# Patient Record
Sex: MALE | Race: WHITE | NOT HISPANIC OR LATINO | Employment: OTHER | URBAN - METROPOLITAN AREA
[De-identification: names, ages, dates, MRNs, and addresses within clinical notes are randomized per-mention and may not be internally consistent; named-entity substitution may affect disease eponyms.]

---

## 2018-12-27 ENCOUNTER — OUTSIDE PLACE OF SERVICE (OUTPATIENT)
Dept: CARDIOLOGY | Facility: CLINIC | Age: 53
End: 2018-12-27
Payer: COMMERCIAL

## 2018-12-27 PROCEDURE — 93010 PR ELECTROCARDIOGRAM REPORT: ICD-10-PCS | Mod: S$GLB,,, | Performed by: STUDENT IN AN ORGANIZED HEALTH CARE EDUCATION/TRAINING PROGRAM

## 2018-12-27 PROCEDURE — 93010 ELECTROCARDIOGRAM REPORT: CPT | Mod: S$GLB,,, | Performed by: STUDENT IN AN ORGANIZED HEALTH CARE EDUCATION/TRAINING PROGRAM

## 2020-09-02 ENCOUNTER — OFFICE VISIT (OUTPATIENT)
Dept: UROLOGY | Facility: CLINIC | Age: 55
End: 2020-09-02
Payer: COMMERCIAL

## 2020-09-02 VITALS
HEIGHT: 67 IN | HEART RATE: 86 BPM | DIASTOLIC BLOOD PRESSURE: 81 MMHG | BODY MASS INDEX: 40.83 KG/M2 | SYSTOLIC BLOOD PRESSURE: 132 MMHG | WEIGHT: 260.13 LBS

## 2020-09-02 DIAGNOSIS — N48.6 PEYRONIE'S DISEASE: Primary | ICD-10-CM

## 2020-09-02 PROCEDURE — 3079F DIAST BP 80-89 MM HG: CPT | Mod: CPTII,S$GLB,, | Performed by: UROLOGY

## 2020-09-02 PROCEDURE — 99999 PR PBB SHADOW E&M-EST. PATIENT-LVL IV: CPT | Mod: PBBFAC,,, | Performed by: UROLOGY

## 2020-09-02 PROCEDURE — 3008F BODY MASS INDEX DOCD: CPT | Mod: CPTII,S$GLB,, | Performed by: UROLOGY

## 2020-09-02 PROCEDURE — 3008F PR BODY MASS INDEX (BMI) DOCUMENTED: ICD-10-PCS | Mod: CPTII,S$GLB,, | Performed by: UROLOGY

## 2020-09-02 PROCEDURE — 3075F PR MOST RECENT SYSTOLIC BLOOD PRESS GE 130-139MM HG: ICD-10-PCS | Mod: CPTII,S$GLB,, | Performed by: UROLOGY

## 2020-09-02 PROCEDURE — 99204 PR OFFICE/OUTPT VISIT, NEW, LEVL IV, 45-59 MIN: ICD-10-PCS | Mod: S$GLB,,, | Performed by: UROLOGY

## 2020-09-02 PROCEDURE — 99999 PR PBB SHADOW E&M-EST. PATIENT-LVL IV: ICD-10-PCS | Mod: PBBFAC,,, | Performed by: UROLOGY

## 2020-09-02 PROCEDURE — 99204 OFFICE O/P NEW MOD 45 MIN: CPT | Mod: S$GLB,,, | Performed by: UROLOGY

## 2020-09-02 PROCEDURE — 3075F SYST BP GE 130 - 139MM HG: CPT | Mod: CPTII,S$GLB,, | Performed by: UROLOGY

## 2020-09-02 PROCEDURE — 3079F PR MOST RECENT DIASTOLIC BLOOD PRESSURE 80-89 MM HG: ICD-10-PCS | Mod: CPTII,S$GLB,, | Performed by: UROLOGY

## 2020-09-02 RX ORDER — KETOCONAZOLE 20 MG/ML
SHAMPOO, SUSPENSION TOPICAL
COMMUNITY

## 2020-09-02 RX ORDER — PRAVASTATIN SODIUM 80 MG/1
80 TABLET ORAL DAILY
COMMUNITY
Start: 2020-08-07

## 2020-09-02 RX ORDER — TESTOSTERONE CYPIONATE 200 MG/ML
INJECTION, SOLUTION INTRAMUSCULAR
COMMUNITY

## 2020-09-02 RX ORDER — PENTOXIFYLLINE 400 MG/1
400 TABLET, EXTENDED RELEASE ORAL 2 TIMES DAILY
COMMUNITY
Start: 2020-08-03

## 2020-09-02 RX ORDER — TADALAFIL 20 MG/1
20 TABLET ORAL DAILY PRN
Qty: 10 TABLET | Refills: 11 | Status: SHIPPED | OUTPATIENT
Start: 2020-09-02

## 2020-09-02 RX ORDER — METHOCARBAMOL 750 MG/1
TABLET, FILM COATED ORAL
COMMUNITY

## 2020-09-02 RX ORDER — COLCHICINE 0.6 MG/1
0.6 TABLET ORAL 2 TIMES DAILY
COMMUNITY
Start: 2020-08-03

## 2020-09-02 RX ORDER — SILDENAFIL 100 MG/1
100 TABLET, FILM COATED ORAL DAILY PRN
COMMUNITY
Start: 2020-06-24

## 2020-09-02 NOTE — PROGRESS NOTES
CHIEF COMPLAINT:    Mr. Rodriguez is a 55 y.o. male presenting for a consultation at the request of Dr. Schwartz. Patient presents with peyronie's disease.    PRESENTING ILLNESS:    Navdeep Rodriguez is a 55 y.o. male who c/o peyronie's disease.  It's been present for 5 months.  He has ~ 80 degrees of curve dorsally.  He has pain with an erection. It is difficult to penetrate due to the curve.  Sex is painful for him.  He's on trental.    He has ED.  Has been present for > 5 months.  He's tried and failed Viagra.    He has hypogonadism.  Is on TRT managed by Dr. Schwartz.    He has LUTS.  Nocturia x 2.  Good FOS. No straining.  No hematuria.  He's pleased with how he voids.    Dr. Garland does his PCa screening.    REVIEW OF SYSTEMS:    Navdeep Rodriguez denies headache, blurred vision, fever, nausea, vomiting, chills, abdominal pain, chest pain, sore throat, bleeding per rectum, cough, SOB, recent loss of consciousness, recent mental status changes, seizures, dizziness, or upper or lower extremity weakness.    HELLEN  1. 1  2. 2  3. 1  4. 1  5. 1      PATIENT HISTORY:    Past Medical History:   Diagnosis Date    Arthritis     Asthma     Hypertension        Past Surgical History:   Procedure Laterality Date    APPENDECTOMY      BACK SURGERY      nerve block    EYE SURGERY      muscle lining the eyes    KNEE SURGERY      ROTATOR CUFF REPAIR         Family History   Problem Relation Age of Onset    Heart disease Father     Diabetes Brother        Social History     Socioeconomic History    Marital status:      Spouse name: Not on file    Number of children: Not on file    Years of education: Not on file    Highest education level: Not on file   Occupational History    Not on file   Social Needs    Financial resource strain: Not on file    Food insecurity     Worry: Not on file     Inability: Not on file    Transportation needs     Medical: Not on file     Non-medical: Not on file   Tobacco Use    Smoking status: Never  Smoker   Substance and Sexual Activity    Alcohol use: Yes     Alcohol/week: 0.8 standard drinks     Types: 1 drink(s) per week     Comment: occassional 1-2/wk    Drug use: No    Sexual activity: Not on file   Lifestyle    Physical activity     Days per week: Not on file     Minutes per session: Not on file    Stress: Not on file   Relationships    Social connections     Talks on phone: Not on file     Gets together: Not on file     Attends Mosque service: Not on file     Active member of club or organization: Not on file     Attends meetings of clubs or organizations: Not on file     Relationship status: Not on file   Other Topics Concern    Not on file   Social History Narrative    Not on file       Allergies:  Patient has no known allergies.    Medications:    Current Outpatient Medications:     aspirin (ECOTRIN) 81 MG EC tablet, Take 81 mg by mouth once daily., Disp: , Rfl:     cyanocobalamin 2000 MCG tablet, Take 2,000 mcg by mouth once daily., Disp: , Rfl:     diclofenac (VOLTAREN) 75 MG EC tablet, Take 75 mg by mouth 2 (two) times daily., Disp: , Rfl:     folic acid (FOLVITE) 1 MG tablet, Take 1 mg by mouth once daily., Disp: , Rfl:     gabapentin (NEURONTIN) 300 MG capsule, Take 900 mg by mouth every evening., Disp: , Rfl:     gabapentin (NEURONTIN) 300 mg tablet, Take 300 mg by mouth 3 (three) times daily., Disp: , Rfl:     glucosamine-chondroitin 500-400 mg tablet, Take 1 tablet by mouth 3 (three) times daily., Disp: , Rfl:     hydrocodone-acetaminophen 10-325mg (NORCO)  mg Tab, Take 1 tablet by mouth 5 (five) times daily. Prn pain, Disp: , Rfl:     losartan (COZAAR) 50 MG tablet, Take 50 mg by mouth once daily., Disp: , Rfl:     metoprolol succinate (TOPROL-XL) 25 MG 24 hr tablet, Take 1/2 a tab daily, Disp: 30 tablet, Rfl: 3    omega-3 acid ethyl esters (LOVAZA) 1 gram capsule, Take 2 g by mouth 2 (two) times daily., Disp: , Rfl:     omega-3 acid ethyl esters (LOVAZA) 1 gram  capsule, Take 1 g by mouth once., Disp: , Rfl:     pravastatin (PRAVACHOL) 20 MG tablet, Take 2 tablets (40 mg total) by mouth once daily., Disp: 30 tablet, Rfl: 3    tizanidine (ZANAFLEX) 4 MG tablet, Take 4 mg by mouth every 6 (six) hours as needed., Disp: , Rfl:     triamterene-hydrochlorothiazide 75-50 mg (MAXZIDE) 75-50 mg per tablet, Take 1 tablet by mouth once daily., Disp: , Rfl:     PHYSICAL EXAMINATION:    The patient generally appears in good health, is appropriately interactive, and is in no apparent distress.     Eyes: anicteric sclerae, moist conjunctivae; no lid-lag; PERRLA     HENT: Atraumatic; oropharynx clear with moist mucous membranes and no mucosal ulcerations;normal hard and soft palate.  No evidence of lymphadenopathy.    Neck: Trachea midline.  No thyromegaly.    Musculoskeletal: No abnormal gait.    Skin: No lesions.    Mental: Cooperative with normal affect.  Is oriented to time, place, and person.    Neuro: Grossly intact.    Chest: Normal inspiratory effort.   No accessory muscles.  No audible wheezes.  Respirations symmetric on inspiration and expiration.    Heart: Regular rhythm.      Abdomen:  Soft, non-tender. No masses or organomegaly. Bladder is not palpable. No evidence of flank discomfort. No evidence of inguinal hernia.    Genitourinary: The penis is circumcised with a plaque c/w peyronie's on the shaft The urethral meatus is normal. The testes, epididymides, and cord structures are normal in size and contour bilaterally. The scrotum is normal in size and contour.    LEONOR deferred as his PCP does his screening.    Extremities: No clubbing, cyanosis, or edema      LABS:      No results found for: PSA, PSADIAG, PSATOTAL, PSAFREE, PSAFREEPCT    IMPRESSION:    Encounter Diagnoses   Name Primary?    Peyronie's disease Yes         PLAN:    1. We discussed the pathophysiology of Peyronie's disease.  We discussed both the acute and chronic phase of the disease.  Because it has only  been present for 5 months, I discussed that I feel he is in the acute phase of the disease.  Continue Trental.  2. Will try Cialis for the ED. Side effects discussed.  A new Rx was given  3. Will plan for doppler u/s of his penis.  Discussed xiaflex vs IPP given he has ED and peyronie's.  4. Continue to see Dr. Schwartz for general urologic care.    Copy to: Efren

## 2020-09-02 NOTE — LETTER
September 2, 2020      Saud Schwartz MD  [address] [place] [address]           [facility] [address]  [address]  [facility] [address]  Phone: [phone]          Patient: Navdeep Rodriguez   MR Number: [id number]   YOB: 1965   Date of Visit: 9/2/2020       Dear Dr. Saud Schwatrz:    Thank you for referring Navdeep Rodriguez to me for evaluation. Attached you will find relevant portions of my assessment and plan of care.    If you have questions, please do not hesitate to call me. I look forward to following Navdeep Rodriguez along with you.    Sincerely,    Gregory Hazel MD    Enclosure  CC:  No Recipients    If you would like to receive this communication electronically, please contact [email or web address] or [phone] to request more information on [employer] Link access.    For providers and/or their staff who would like to refer a patient to [facility], please contact us through our one-stop-shop provider referral line, [facility], at [phone].    If you feel you have received this communication in error or would no longer like to receive these types of communications, please e-mail [email or web address]

## 2020-11-05 ENCOUNTER — TELEPHONE (OUTPATIENT)
Dept: UROLOGY | Facility: CLINIC | Age: 55
End: 2020-11-05

## 2020-11-05 ENCOUNTER — PROCEDURE VISIT (OUTPATIENT)
Dept: UROLOGY | Facility: CLINIC | Age: 55
End: 2020-11-05
Payer: COMMERCIAL

## 2020-11-05 VITALS
BODY MASS INDEX: 40.81 KG/M2 | DIASTOLIC BLOOD PRESSURE: 80 MMHG | RESPIRATION RATE: 16 BRPM | SYSTOLIC BLOOD PRESSURE: 131 MMHG | TEMPERATURE: 98 F | WEIGHT: 260 LBS | HEIGHT: 67 IN | HEART RATE: 70 BPM

## 2020-11-05 DIAGNOSIS — N48.6 PEYRONIE'S DISEASE: Primary | ICD-10-CM

## 2020-11-05 PROCEDURE — 93981 PR PENILE VASCULAR STUDY,LTD OR F/U: ICD-10-PCS | Mod: 26,59,S$GLB, | Performed by: UROLOGY

## 2020-11-05 PROCEDURE — 96372 PR INJECTION,THERAP/PROPH/DIAG2ST, IM OR SUBCUT: ICD-10-PCS | Mod: 59,S$GLB,, | Performed by: UROLOGY

## 2020-11-05 PROCEDURE — 96372 THER/PROPH/DIAG INJ SC/IM: CPT | Mod: 59,S$GLB,, | Performed by: UROLOGY

## 2020-11-05 PROCEDURE — 93981 PENILE VASCULAR STUDY: CPT | Mod: 26,59,S$GLB, | Performed by: UROLOGY

## 2020-11-05 PROCEDURE — 54235 NJX CORPORA CAVERNOSA RX AGT: CPT | Mod: S$GLB,,, | Performed by: UROLOGY

## 2020-11-05 PROCEDURE — 54235 PR INJECT CORPORA CAVERN,PHARM AGNT: ICD-10-PCS | Mod: S$GLB,,, | Performed by: UROLOGY

## 2020-11-05 PROCEDURE — 93980 PR PENILE VASCULAR STUDY,COMPLETE: ICD-10-PCS | Mod: 26,S$GLB,, | Performed by: UROLOGY

## 2020-11-05 PROCEDURE — 93980 PENILE VASCULAR STUDY: CPT | Mod: 26,S$GLB,, | Performed by: UROLOGY

## 2020-11-05 RX ORDER — PAPAVERINE HYDROCHLORIDE 30 MG/ML
90 INJECTION INTRAMUSCULAR; INTRAVENOUS
Status: COMPLETED | OUTPATIENT
Start: 2020-11-05 | End: 2020-11-05

## 2020-11-05 RX ADMIN — PAPAVERINE HYDROCHLORIDE 90 MG: 30 INJECTION INTRAMUSCULAR; INTRAVENOUS at 01:11

## 2020-11-05 NOTE — PROCEDURES
Procedures Date: 11/05/2020     Surgeon: Ilir     Assistant: None     Procedure performed: 1. Doppler ultrasound of the penis (complete and follow up)          2. Intracavernosal injection of pharmacologic agent   Blood loss: None     Specimen: None     Procedure in detail:   The risks, benefits, complications, treatment options, and expected outcomes were discussed with the patient. The patient concurred with the proposed plan, giving informed consent.     In the flaccid state the patient's cavernosal artery was found with the doppler ultrasound. This was first done on the R and then on the L. His peak systolic velocity was 8.29 cm/s on the R and 11.3 cm/s  on the L.  End diastolic velocity was 2.35 cm/s on the R and 0.99 cm on the L.   The resistive index was 0.72 on the R and 0.91 on the L.    Using standard sterile technique, the patient was then injected with 90 mg of papaverine. A full erection was achieved.  He had approximately 45 degree curvature dorsally.    The cavernosal arteries were then measured again with the doppler ultrasound. His peak systolic velocity was 11.4  cm/s on the R and 22.1 cm/s on the L. End diastolic velocity was 4.21 cm/s on the R and 5.44 cm/s on the L.   The resistive index was 0.63 on the R and 0.75 on the L.    He tolerated the procedure well without complication. He will be observed to ensure detumescence. He will be discharged home in stable condition and follow up to discuss his treatment options in the next 1-2 weeks.

## 2020-11-05 NOTE — TELEPHONE ENCOUNTER
Please set up for xiaflex.    After Michael's doppler, we discussed surgical options for his peyronie's.  We discussed IPP, Xiaflex, plication, and incision and grafting as well as the indications for each.  We discussed these in conjunction with his ultrasound findings.  He was given the chance to ask questions.  Alternative treatments were discussed as well.  He would like to proceed with IPP.    However, he has [employer].  Therefore, will try Xiaflex.  He understands that this will not improve his ED.  If he still has ED after the xiaflex, he may need ICI as he's failed oral meds.  Discussed that this can worsen peyronie's.    Risks and benefits of xiaflex discussed including pain, bleeding, failure to improve curve, increased risk of penile fracture, need for multiple treatment cycles.  Alternatives discussed.  He was given the chance to ask questions.

## 2020-11-05 NOTE — PATIENT INSTRUCTIONS
After your Doppler US Procedure/PEP injection:    You may resume your usual diet, medications and activities following the procedure. Some side effects may occur that usually do not need medical attention. These side effects may go away during treatment as your body adjusts to the medicine. Contact your [facility]rologist at [phone] if any of the following side effects continue or become extremely bothersome or if you have any questions.    Bruising or bleeding at place of injection   Burning (mild) along penis   Swelling at place of injection    You may also reach a urology resident on call after regular clinic hours and on weekends at [phone]. Papaverine injected into the penis may cause tingling at the tip of the penis. This is no cause for concern.

## 2020-12-21 NOTE — TELEPHONE ENCOUNTER
Pt has been scheduled for xiaflex. Insurance has not authorized. appt had to be rescheduled to approved.

## 2021-01-04 ENCOUNTER — OFFICE VISIT (OUTPATIENT)
Dept: SPORTS MEDICINE | Facility: CLINIC | Age: 56
End: 2021-01-04
Payer: COMMERCIAL

## 2021-01-04 ENCOUNTER — HOSPITAL ENCOUNTER (OUTPATIENT)
Dept: RADIOLOGY | Facility: HOSPITAL | Age: 56
Discharge: HOME | End: 2021-01-04
Attending: ORTHOPAEDIC SURGERY
Payer: COMMERCIAL

## 2021-01-04 VITALS
SYSTOLIC BLOOD PRESSURE: 138 MMHG | BODY MASS INDEX: 40.65 KG/M2 | WEIGHT: 259 LBS | DIASTOLIC BLOOD PRESSURE: 77 MMHG | HEART RATE: 70 BPM | HEIGHT: 67 IN

## 2021-01-04 DIAGNOSIS — M19.012 OSTEOARTHRITIS OF LEFT ACROMIOCLAVICULAR JOINT: Primary | ICD-10-CM

## 2021-01-04 DIAGNOSIS — M25.512 LEFT SHOULDER PAIN, UNSPECIFIED CHRONICITY: ICD-10-CM

## 2021-01-04 DIAGNOSIS — G25.89 SCAPULAR DYSKINESIS: ICD-10-CM

## 2021-01-04 DIAGNOSIS — M19.012 PRIMARY OSTEOARTHRITIS OF LEFT SHOULDER: ICD-10-CM

## 2021-01-04 PROCEDURE — 73030 X-RAY EXAM OF SHOULDER: CPT | Mod: 26,LT,, | Performed by: RADIOLOGY

## 2021-01-04 PROCEDURE — 99999 PR PBB SHADOW E&M-EST. PATIENT-LVL V: CPT | Mod: PBBFAC,,, | Performed by: ORTHOPAEDIC SURGERY

## 2021-01-04 PROCEDURE — 20605 INTERMEDIATE JOINT ASPIRATION/INJECTION: L ACROMIOCLAVICULAR: ICD-10-PCS | Mod: LT,S$GLB,, | Performed by: ORTHOPAEDIC SURGERY

## 2021-01-04 PROCEDURE — 3008F BODY MASS INDEX DOCD: CPT | Mod: CPTII,S$GLB,, | Performed by: ORTHOPAEDIC SURGERY

## 2021-01-04 PROCEDURE — 3078F DIAST BP <80 MM HG: CPT | Mod: CPTII,S$GLB,, | Performed by: ORTHOPAEDIC SURGERY

## 2021-01-04 PROCEDURE — 99999 PR PBB SHADOW E&M-EST. PATIENT-LVL V: ICD-10-PCS | Mod: PBBFAC,,, | Performed by: ORTHOPAEDIC SURGERY

## 2021-01-04 PROCEDURE — 3078F PR MOST RECENT DIASTOLIC BLOOD PRESSURE < 80 MM HG: ICD-10-PCS | Mod: CPTII,S$GLB,, | Performed by: ORTHOPAEDIC SURGERY

## 2021-01-04 PROCEDURE — 1126F PR PAIN SEVERITY QUANTIFIED, NO PAIN PRESENT: ICD-10-PCS | Mod: S$GLB,,, | Performed by: ORTHOPAEDIC SURGERY

## 2021-01-04 PROCEDURE — 73030 XR SHOULDER COMPLETE 2 OR MORE VIEWS LEFT: ICD-10-PCS | Mod: 26,LT,, | Performed by: RADIOLOGY

## 2021-01-04 PROCEDURE — 3008F PR BODY MASS INDEX (BMI) DOCUMENTED: ICD-10-PCS | Mod: CPTII,S$GLB,, | Performed by: ORTHOPAEDIC SURGERY

## 2021-01-04 PROCEDURE — 73030 X-RAY EXAM OF SHOULDER: CPT | Mod: TC,LT

## 2021-01-04 PROCEDURE — 20605 DRAIN/INJ JOINT/BURSA W/O US: CPT | Mod: LT,S$GLB,, | Performed by: ORTHOPAEDIC SURGERY

## 2021-01-04 PROCEDURE — 99203 PR OFFICE/OUTPT VISIT, NEW, LEVL III, 30-44 MIN: ICD-10-PCS | Mod: 25,S$GLB,, | Performed by: ORTHOPAEDIC SURGERY

## 2021-01-04 PROCEDURE — 3075F PR MOST RECENT SYSTOLIC BLOOD PRESS GE 130-139MM HG: ICD-10-PCS | Mod: CPTII,S$GLB,, | Performed by: ORTHOPAEDIC SURGERY

## 2021-01-04 PROCEDURE — 3075F SYST BP GE 130 - 139MM HG: CPT | Mod: CPTII,S$GLB,, | Performed by: ORTHOPAEDIC SURGERY

## 2021-01-04 PROCEDURE — 1126F AMNT PAIN NOTED NONE PRSNT: CPT | Mod: S$GLB,,, | Performed by: ORTHOPAEDIC SURGERY

## 2021-01-04 PROCEDURE — 99203 OFFICE O/P NEW LOW 30 MIN: CPT | Mod: 25,S$GLB,, | Performed by: ORTHOPAEDIC SURGERY

## 2021-01-04 RX ORDER — DEXAMETHASONE SODIUM PHOSPHATE 4 MG/ML
4 INJECTION, SOLUTION INTRA-ARTICULAR; INTRALESIONAL; INTRAMUSCULAR; INTRAVENOUS; SOFT TISSUE
Status: DISCONTINUED | OUTPATIENT
Start: 2021-01-04 | End: 2021-01-04 | Stop reason: HOSPADM

## 2021-01-04 RX ADMIN — DEXAMETHASONE SODIUM PHOSPHATE 4 MG: 4 INJECTION, SOLUTION INTRA-ARTICULAR; INTRALESIONAL; INTRAMUSCULAR; INTRAVENOUS; SOFT TISSUE at 03:01

## 2021-03-31 ENCOUNTER — OFFICE VISIT (OUTPATIENT)
Dept: SPORTS MEDICINE | Facility: CLINIC | Age: 56
End: 2021-03-31
Payer: COMMERCIAL

## 2021-03-31 VITALS
HEIGHT: 67 IN | WEIGHT: 255.19 LBS | BODY MASS INDEX: 40.05 KG/M2 | SYSTOLIC BLOOD PRESSURE: 129 MMHG | DIASTOLIC BLOOD PRESSURE: 79 MMHG

## 2021-03-31 DIAGNOSIS — M25.512 LEFT SHOULDER PAIN, UNSPECIFIED CHRONICITY: Primary | ICD-10-CM

## 2021-03-31 PROCEDURE — 1126F AMNT PAIN NOTED NONE PRSNT: CPT | Mod: S$GLB,,, | Performed by: ORTHOPAEDIC SURGERY

## 2021-03-31 PROCEDURE — 99213 OFFICE O/P EST LOW 20 MIN: CPT | Mod: S$GLB,,, | Performed by: ORTHOPAEDIC SURGERY

## 2021-03-31 PROCEDURE — 99999 PR PBB SHADOW E&M-EST. PATIENT-LVL IV: ICD-10-PCS | Mod: PBBFAC,,, | Performed by: ORTHOPAEDIC SURGERY

## 2021-03-31 PROCEDURE — 3074F SYST BP LT 130 MM HG: CPT | Mod: CPTII,S$GLB,, | Performed by: ORTHOPAEDIC SURGERY

## 2021-03-31 PROCEDURE — 3008F BODY MASS INDEX DOCD: CPT | Mod: CPTII,S$GLB,, | Performed by: ORTHOPAEDIC SURGERY

## 2021-03-31 PROCEDURE — 1126F PR PAIN SEVERITY QUANTIFIED, NO PAIN PRESENT: ICD-10-PCS | Mod: S$GLB,,, | Performed by: ORTHOPAEDIC SURGERY

## 2021-03-31 PROCEDURE — 3078F PR MOST RECENT DIASTOLIC BLOOD PRESSURE < 80 MM HG: ICD-10-PCS | Mod: CPTII,S$GLB,, | Performed by: ORTHOPAEDIC SURGERY

## 2021-03-31 PROCEDURE — 3078F DIAST BP <80 MM HG: CPT | Mod: CPTII,S$GLB,, | Performed by: ORTHOPAEDIC SURGERY

## 2021-03-31 PROCEDURE — 3008F PR BODY MASS INDEX (BMI) DOCUMENTED: ICD-10-PCS | Mod: CPTII,S$GLB,, | Performed by: ORTHOPAEDIC SURGERY

## 2021-03-31 PROCEDURE — 3074F PR MOST RECENT SYSTOLIC BLOOD PRESSURE < 130 MM HG: ICD-10-PCS | Mod: CPTII,S$GLB,, | Performed by: ORTHOPAEDIC SURGERY

## 2021-03-31 PROCEDURE — 99213 PR OFFICE/OUTPT VISIT, EST, LEVL III, 20-29 MIN: ICD-10-PCS | Mod: S$GLB,,, | Performed by: ORTHOPAEDIC SURGERY

## 2021-03-31 PROCEDURE — 99999 PR PBB SHADOW E&M-EST. PATIENT-LVL IV: CPT | Mod: PBBFAC,,, | Performed by: ORTHOPAEDIC SURGERY

## 2021-06-25 ENCOUNTER — OUTSIDE PLACE OF SERVICE (OUTPATIENT)
Dept: CARDIOLOGY | Facility: CLINIC | Age: 56
End: 2021-06-25
Payer: MEDICARE

## 2021-06-25 PROCEDURE — 93010 PR ELECTROCARDIOGRAM REPORT: ICD-10-PCS | Mod: ,,, | Performed by: INTERNAL MEDICINE

## 2021-06-25 PROCEDURE — 93010 ELECTROCARDIOGRAM REPORT: CPT | Mod: ,,, | Performed by: INTERNAL MEDICINE

## 2024-04-16 ENCOUNTER — HOSPITAL ENCOUNTER (EMERGENCY)
Facility: HOSPITAL | Age: 59
Discharge: HOME | End: 2024-04-16
Attending: EMERGENCY MEDICINE
Payer: COMMERCIAL

## 2024-04-16 VITALS
TEMPERATURE: 98 F | BODY MASS INDEX: 36.1 KG/M2 | SYSTOLIC BLOOD PRESSURE: 147 MMHG | HEART RATE: 89 BPM | DIASTOLIC BLOOD PRESSURE: 99 MMHG | RESPIRATION RATE: 19 BRPM | HEIGHT: 67 IN | WEIGHT: 230 LBS | OXYGEN SATURATION: 97 %

## 2024-04-16 DIAGNOSIS — R55 SYNCOPE: ICD-10-CM

## 2024-04-16 LAB
ALBUMIN SERPL BCP-MCNC: 4.2 G/DL (ref 3.5–5.2)
ALP SERPL-CCNC: 80 U/L (ref 38–126)
ALT SERPL W/O P-5'-P-CCNC: 67 U/L (ref 10–44)
ANION GAP SERPL CALC-SCNC: 10 MMOL/L (ref 8–16)
AST SERPL-CCNC: 49 U/L (ref 15–46)
BASOPHILS # BLD AUTO: 0.03 K/UL (ref 0–0.2)
BASOPHILS NFR BLD: 0.5 % (ref 0–1.9)
BILIRUB SERPL-MCNC: 1.1 MG/DL (ref 0.1–1)
BILIRUB UR QL STRIP: NEGATIVE
CALCIUM SERPL-MCNC: 9.7 MG/DL (ref 8.7–10.5)
CHLORIDE SERPL-SCNC: 100 MMOL/L (ref 95–110)
CK SERPL-CCNC: 213 U/L (ref 55–170)
CLARITY UR REFRACT.AUTO: CLEAR
CO2 SERPL-SCNC: 30 MMOL/L (ref 23–29)
COLOR UR AUTO: YELLOW
CREAT SERPL-MCNC: 1.34 MG/DL (ref 0.5–1.4)
D DIMER PPP IA.FEU-MCNC: 0.65 MG/L FEU
DIFFERENTIAL METHOD BLD: ABNORMAL
EOSINOPHIL # BLD AUTO: 0.4 K/UL (ref 0–0.5)
EOSINOPHIL NFR BLD: 6.8 % (ref 0–8)
ERYTHROCYTE [DISTWIDTH] IN BLOOD BY AUTOMATED COUNT: 12.7 % (ref 11.5–14.5)
EST. GFR  (NO RACE VARIABLE): >60 ML/MIN/1.73 M^2
GLUCOSE SERPL-MCNC: 101 MG/DL (ref 70–110)
GLUCOSE UR QL STRIP: NEGATIVE
HCT VFR BLD AUTO: 47.5 % (ref 40–54)
HGB BLD-MCNC: 16.2 G/DL (ref 14–18)
HGB UR QL STRIP: NEGATIVE
IMM GRANULOCYTES # BLD AUTO: 0.01 K/UL (ref 0–0.04)
IMM GRANULOCYTES NFR BLD AUTO: 0.2 % (ref 0–0.5)
KETONES UR QL STRIP: NEGATIVE
LEUKOCYTE ESTERASE UR QL STRIP: NEGATIVE
LYMPHOCYTES # BLD AUTO: 0.9 K/UL (ref 1–4.8)
LYMPHOCYTES NFR BLD: 15.7 % (ref 18–48)
MAGNESIUM SERPL-MCNC: 1.6 MG/DL (ref 1.6–2.6)
MCH RBC QN AUTO: 31.3 PG (ref 27–31)
MCHC RBC AUTO-ENTMCNC: 34.1 G/DL (ref 32–36)
MCV RBC AUTO: 92 FL (ref 82–98)
MONOCYTES # BLD AUTO: 0.6 K/UL (ref 0.3–1)
MONOCYTES NFR BLD: 10.8 % (ref 4–15)
NEUTROPHILS # BLD AUTO: 3.8 K/UL (ref 1.8–7.7)
NEUTROPHILS NFR BLD: 66 % (ref 38–73)
NITRITE UR QL STRIP: NEGATIVE
NRBC BLD-RTO: 0 /100 WBC
NT-PROBNP SERPL-MCNC: <20 PG/ML (ref 5–900)
OHS QRS DURATION: 96 MS
OHS QTC CALCULATION: 428 MS
PH UR STRIP: 7 [PH] (ref 5–8)
PLATELET # BLD AUTO: 143 K/UL (ref 150–450)
PMV BLD AUTO: 9.4 FL (ref 9.2–12.9)
POCT GLUCOSE: 97 MG/DL (ref 70–110)
POTASSIUM SERPL-SCNC: 4.1 MMOL/L (ref 3.5–5.1)
PROT SERPL-MCNC: 7.5 G/DL (ref 6–8.4)
PROT UR QL STRIP: ABNORMAL
RBC # BLD AUTO: 5.18 M/UL (ref 4.6–6.2)
SODIUM SERPL-SCNC: 140 MMOL/L (ref 136–145)
SP GR UR STRIP: 1.02 (ref 1–1.03)
TROPONIN I SERPL-MCNC: <0.012 NG/ML (ref 0.01–0.03)
URN SPEC COLLECT METH UR: ABNORMAL
UROBILINOGEN UR STRIP-ACNC: 1 EU/DL
UUN UR-MCNC: 28 MG/DL (ref 2–20)
WBC # BLD AUTO: 5.75 K/UL (ref 3.9–12.7)

## 2024-04-16 PROCEDURE — 99285 EMERGENCY DEPT VISIT HI MDM: CPT | Mod: 25,ER

## 2024-04-16 PROCEDURE — 93010 ELECTROCARDIOGRAM REPORT: CPT | Mod: ,,, | Performed by: INTERNAL MEDICINE

## 2024-04-16 PROCEDURE — 63600175 PHARM REV CODE 636 W HCPCS: Mod: ER | Performed by: PHYSICIAN ASSISTANT

## 2024-04-16 PROCEDURE — 83735 ASSAY OF MAGNESIUM: CPT | Mod: ER | Performed by: PHYSICIAN ASSISTANT

## 2024-04-16 PROCEDURE — 83880 ASSAY OF NATRIURETIC PEPTIDE: CPT | Mod: ER | Performed by: PHYSICIAN ASSISTANT

## 2024-04-16 PROCEDURE — 85025 COMPLETE CBC W/AUTO DIFF WBC: CPT | Mod: ER | Performed by: PHYSICIAN ASSISTANT

## 2024-04-16 PROCEDURE — 25000003 PHARM REV CODE 250: Mod: ER | Performed by: PHYSICIAN ASSISTANT

## 2024-04-16 PROCEDURE — 94761 N-INVAS EAR/PLS OXIMETRY MLT: CPT | Mod: ER

## 2024-04-16 PROCEDURE — 85379 FIBRIN DEGRADATION QUANT: CPT | Mod: ER | Performed by: PHYSICIAN ASSISTANT

## 2024-04-16 PROCEDURE — 96361 HYDRATE IV INFUSION ADD-ON: CPT | Mod: ER

## 2024-04-16 PROCEDURE — 84484 ASSAY OF TROPONIN QUANT: CPT | Mod: ER | Performed by: PHYSICIAN ASSISTANT

## 2024-04-16 PROCEDURE — 81003 URINALYSIS AUTO W/O SCOPE: CPT | Mod: ER | Performed by: PHYSICIAN ASSISTANT

## 2024-04-16 PROCEDURE — 80053 COMPREHEN METABOLIC PANEL: CPT | Mod: ER | Performed by: PHYSICIAN ASSISTANT

## 2024-04-16 PROCEDURE — 82962 GLUCOSE BLOOD TEST: CPT | Mod: ER

## 2024-04-16 PROCEDURE — 96374 THER/PROPH/DIAG INJ IV PUSH: CPT | Mod: 59,ER

## 2024-04-16 PROCEDURE — 93005 ELECTROCARDIOGRAM TRACING: CPT | Mod: ER

## 2024-04-16 PROCEDURE — 82550 ASSAY OF CK (CPK): CPT | Mod: ER | Performed by: PHYSICIAN ASSISTANT

## 2024-04-16 PROCEDURE — 25500020 PHARM REV CODE 255: Mod: ER | Performed by: PHYSICIAN ASSISTANT

## 2024-04-16 PROCEDURE — 99900035 HC TECH TIME PER 15 MIN (STAT): Mod: ER

## 2024-04-16 RX ORDER — ONDANSETRON HYDROCHLORIDE 2 MG/ML
4 INJECTION, SOLUTION INTRAVENOUS
Status: COMPLETED | OUTPATIENT
Start: 2024-04-16 | End: 2024-04-16

## 2024-04-16 RX ORDER — PREDNISOLONE ACETATE 10 MG/ML
SUSPENSION/ DROPS OPHTHALMIC
COMMUNITY
Start: 2024-04-15

## 2024-04-16 RX ORDER — NAPROXEN 500 MG/1
500 TABLET ORAL 2 TIMES DAILY
COMMUNITY
Start: 2024-04-01

## 2024-04-16 RX ORDER — CYCLOBENZAPRINE HCL 10 MG
10 TABLET ORAL NIGHTLY
COMMUNITY

## 2024-04-16 RX ORDER — TADALAFIL 5 MG/1
5 TABLET ORAL DAILY
COMMUNITY

## 2024-04-16 RX ADMIN — ONDANSETRON 4 MG: 2 INJECTION INTRAMUSCULAR; INTRAVENOUS at 01:04

## 2024-04-16 RX ADMIN — IOHEXOL 100 ML: 350 INJECTION, SOLUTION INTRAVENOUS at 02:04

## 2024-04-16 RX ADMIN — SODIUM CHLORIDE 1000 ML: 9 INJECTION, SOLUTION INTRAVENOUS at 01:04

## 2024-04-16 NOTE — ED NOTES
LOC: The patient is awake, alert and aware of environment with an appropriate affect, the patient is oriented x 3 and speaking appropriate.  APPEARANCE: Patient resting comfortably and in no acute distress, patient is clean and well groomed, patient's clothing is properly fastened.  SKIN: The skin is warm and dry, patient has normal skin turgor and moist mucus membranes.  RESPIRATORY: Airway is open and patent, respirations are spontaneous, patient has a normal effort and rate.  Pain in left lateral ribs with inspiration.  Pain 4/10 with normal breathing and 8/10 with deep inspiration.    CARDIAC: Patient has a normal rate and rhythm, no periphreal edema noted, capillary refill < 3 seconds.  ABDOMEN: Soft and non tender to palpation, no distention noted.  NEUROLOGIC: PERRL, facial expression is symmetrical, patient moving all extremities, normal sensation in all extremities when touched with a finger. Patient is able to grasp both hands and has equal strength and the patient was able to stick his tongue out and smile.  Patient also able to push against hands with his feet-equal strength bilaterally.

## 2024-04-16 NOTE — PHARMACY MED REC
"[facility]           Pharmacy  Admission Medication History     The home medication history was taken by Josefina Keller.      Medication history obtained from Medications listed below were obtained from: Patient/family    Based on information gathered for medication list, you may go to "Admission" then "Reconcile Home Medications" tabs to review and/or act upon those items.     The home medication list has been updated by the Pharmacy department.   Please read ALL comments highlighted in yellow.   Please address this information as you see fit.    Feel free to contact us if you have any questions or require assistance.    The medications listed below were removed from the home medication list.  Please reorder if appropriate:    Patient reports NOT TAKING the following medication(s):  Voltaren 75mg  Norco 10-325mg  Toprol xl 25mg      Current Facility-Administered Medications on File Prior to Encounter   Medication Dose Route Frequency Provider Last Rate Last Admin    collagenase injection 0.58 mg  0.58 mg Intra-Lesional 1 time in Clinic/HOD Gregory Hazel MD         Current Outpatient Medications on File Prior to Encounter   Medication Sig Dispense Refill    aspirin (ECOTRIN) 81 MG EC tablet Take 81 mg by mouth once daily.      colchicine (COLCRYS) 0.6 mg tablet Take 0.6 mg by mouth 2 (two) times daily.      cyanocobalamin 2000 MCG tablet Take 2,000 mcg by mouth once daily.      cyclobenzaprine (FLEXERIL) 10 MG tablet Take 10 mg by mouth every evening.      folic acid (FOLVITE) 1 MG tablet Take 1 mg by mouth once daily.      gabapentin (NEURONTIN) 300 MG capsule Take 1,200 mg by mouth every evening. 4 capsules      losartan (COZAAR) 50 MG tablet Take 50 mg by mouth once daily.      methocarbamoL (ROBAXIN) 750 MG Tab Take 750 mg by mouth 3 (three) times daily as needed (muscle spasms).      naproxen (NAPROSYN) 500 MG tablet Take 500 mg by mouth 2 (two) times daily.      omega-3 acid ethyl " esters (LOVAZA) 1 gram capsule Take 1 g by mouth once daily.      pentoxifylline (TRENTAL) 400 mg TbSR Take 400 mg by mouth 2 (two) times daily.      pravastatin (PRAVACHOL) 80 MG tablet Take 80 mg by mouth once daily.      prednisoLONE acetate (PRED FORTE) 1 % DrpS Place into both eyes.      tadalafiL (CIALIS) 5 MG tablet Take 5 mg by mouth once daily.      testosterone cypionate (DEPO-TESTOSTERONE) 200 mg/mL injection Inject 2 mLs into the muscle every 28 days.      triamterene-hydrochlorothiazide 75-50 mg (MAXZIDE) 75-50 mg per tablet Take 1 tablet by mouth once daily.      ketoconazole (NIZORAL) 2 % shampoo ketoconazole 2 % shampoo   WASH SCALP EVERY OTHER DAY -- WAIT 10 MINUTES BEFORE RINSING         Please address this information as you see fit.  Feel free to contact us if you have any questions or require assistance.    Josefina Keller  [phone]                  .

## 2024-04-16 NOTE — ED PROVIDER NOTES
Encounter Date: 4/16/2024       History     Chief Complaint   Patient presents with    Loss of Consciousness     PT states yesterday I was cutting grass and I got over heated and blacked out. Pt states he did not hit his head, but he hit his ribs and feels they may be fractured. PT states he still feels a little loopy today.      58-year-old male PMH HTN, HLD, chronic back pain, presents to ED after syncopal event that occurred this morning.  Patient reports he was outside of his home move around boxes with minimal exertion when he states he suddenly began to feel lightheaded followed by brief syncopal event.  During syncopal event he does report contusing left side of rib cage against edge of table and then falling onto left-sided buttock.  Denying any head injury with no associated headache.  Since event he has continued to have generalized nausea and mild fatigue.  He denies any associated dizziness or sensation of room spinning, acute visual changes, acute numbness or focal weakness, chest pain, cough, shortness breath, palpitations, abdominal issues, urinary or bowel complaints.  No recent travel or surgery, history of DVT/PE, leg swelling.  No other acute complaints at this time    The history is provided by the patient.     Review of patient's allergies indicates:  No Known Allergies  Past Medical History:   Diagnosis Date    Arthritis     Asthma     Hypertension      Past Surgical History:   Procedure Laterality Date    APPENDECTOMY      BACK SURGERY      nerve block    EYE SURGERY      muscle lining the eyes    KNEE SURGERY      ROTATOR CUFF REPAIR       Family History   Problem Relation Name Age of Onset    Heart disease Father      Diabetes Brother       Social History     Tobacco Use    Smoking status: Never   Substance Use Topics    Alcohol use: Yes     Alcohol/week: 0.8 standard drinks of alcohol     Types: 1 drink(s) per week     Comment: occassional 1-2/wk    Drug use: No     Review of Systems    Constitutional:  Negative for chills and fever.   Eyes:  Negative for visual disturbance.   Respiratory:  Negative for cough and shortness of breath.    Cardiovascular:  Negative for chest pain, palpitations and leg swelling.        Left rib tenderness   Gastrointestinal:  Positive for nausea. Negative for abdominal pain, diarrhea and vomiting.   Genitourinary:  Negative for dysuria and frequency.   Musculoskeletal:  Positive for back pain (Chronic). Negative for neck pain and neck stiffness.   Neurological:  Positive for syncope and light-headedness. Negative for dizziness, weakness and headaches.       Physical Exam     Initial Vitals [04/16/24 1141]   BP Pulse Resp Temp SpO2   137/86 91 18 97.9 °F (36.6 °C) (!) 94 %      MAP       --         Physical Exam    Vitals reviewed.  Constitutional: He appears well-developed and well-nourished. He is cooperative. He does not have a sickly appearance. He does not appear ill. No distress.   HENT:   Head: Normocephalic and atraumatic.   No signs of head trauma   Eyes: EOM are normal.   Neck:   Normal range of motion.  Cardiovascular:  Normal rate and regular rhythm.           Pulmonary/Chest: Effort normal and breath sounds normal.   Abdominal: There is no abdominal tenderness.   Musculoskeletal:      Cervical back: Normal range of motion. No spinous process tenderness or muscular tenderness.     Neurological: He is alert and oriented to person, place, and time. GCS eye subscore is 4. GCS verbal subscore is 5. GCS motor subscore is 6.   Psychiatric: He has a normal mood and affect. His speech is normal and behavior is normal.         ED Course   Procedures  Labs Reviewed   CBC W/ AUTO DIFFERENTIAL - Abnormal; Notable for the following components:       Result Value    MCH 31.3 (*)     Platelets 143 (*)     Lymph # 0.9 (*)     Lymph % 15.7 (*)     All other components within normal limits   COMPREHENSIVE METABOLIC PANEL - Abnormal; Notable for the following components:     CO2 30 (*)     BUN 28 (*)     Total Bilirubin 1.1 (*)     AST 49 (*)     ALT 67 (*)     All other components within normal limits   URINALYSIS, REFLEX TO URINE CULTURE - Abnormal; Notable for the following components:    Protein, UA Trace (*)     All other components within normal limits    Narrative:     Preferred Collection Type->Urine, Clean Catch  Specimen Source->Urine   CK - Abnormal; Notable for the following components:     (*)     All other components within normal limits   D DIMER, QUANTITATIVE - Abnormal; Notable for the following components:    D-Dimer 0.65 (*)     All other components within normal limits   NT-PRO NATRIURETIC PEPTIDE   TROPONIN I   MAGNESIUM   POCT GLUCOSE   POCT GLUCOSE MONITORING CONTINUOUS        ECG Results              EKG 12-lead (In process)        Collection Time Result Time QRS Duration OHS QTC Calculation    04/16/24 12:35:36 04/16/24 14:09:00 96 428                     In process by Interface, Lab In [facility] (04/16/24 14:09:07)                   Narrative:    Test Reason : R55,    Vent. Rate : 088 BPM     Atrial Rate : 088 BPM     P-R Int : 190 ms          QRS Dur : 096 ms      QT Int : 354 ms       P-R-T Axes : 048 038 028 degrees     QTc Int : 428 ms    Normal sinus rhythm  Cannot rule out Anterior infarct ,age undetermined  Abnormal ECG  When compared with ECG of 25-JUN-2021 10:08,  No significant change was found    Referred By: [email or web address]   SELF           Confirmed By:                                   Imaging Results              CTA Chest Non-Coronary (PE Studies) (Final result)  Result time 04/16/24 15:05:24      Final result by Genaro Bhakta MD (Timothy) (04/16/24 15:05:24)                   Impression:      Lungs are clear.  No evidence of pulmonary embolism.    Suspected discitis at T12-L1.  Correlation with MRI of the lumbar spine is recommended    Gallstones.  Splenomegaly.    All CT scans at this facility use dose modulation, iterative reconstruction  and/or weight based dosing when appropriate to reduce radiation dose to as low as reasonably achievable.      Electronically signed by: Genaro Bhakta MD  Date:    04/16/2024  Time:    15:05               Narrative:    EXAMINATION:  CTA CHEST NON CORONARY (PE STUDIES)    CLINICAL HISTORY:  Pulmonary embolism (PE) suspected, positive D-dimer;    TECHNIQUE:  Standard CTA of the chest performed with 100 cc Omnipaque 350 utilizing 3-D MIP reformations.    COMPARISON:  None    FINDINGS:  Cardiac size is normal.  No coronary artery calcifications.  Major pulmonary arteries are normal.    Lung fields are clear.    The spleen is enlarged.  Gallstones are present    There is endplate irregularity at T12-L1 which could be related to discitis.                                       X-Ray Chest 1 View (Final result)  Result time 04/16/24 12:57:40      Final result by Genaro Bhakta (Rosendo), MD (04/16/24 12:57:40)                   Impression:      Lungs appear clear.      Electronically signed by: Genaro Bhakta MD  Date:    04/16/2024  Time:    12:57               Narrative:    EXAMINATION:  XR CHEST 1 VIEW    CLINICAL HISTORY:  Syncope,    COMPARISON:  Chest, 06/23/2013.    FINDINGS:  One view.  Heart size is normal. The lung fields are clear. No acute cardiopulmonary infiltrate.                                       Medications   sodium chloride 0.9% bolus 1,000 mL 1,000 mL (0 mLs Intravenous Stopped 4/16/24 1458)   ondansetron injection 4 mg (4 mg Intravenous Given 4/16/24 1304)   iohexoL (OMNIPAQUE 350) injection 100 mL (100 mLs Intravenous Given 4/16/24 1407)     Medical Decision Making  Patient presents to ED after syncopal event that occurred this morning. Vitals grossly unremarkable. Patient in no distress on exam    DDx:  Including but not limited to dehydration, PRIYA, rhabdomyolysis, electrolyte abnormality, ACS, STEMI, NSTEMI, PE, CAD, pneumothorax, hemothorax, rib fracture, hypoglycemia, vasovagal,  syncope    Amount and/or Complexity of Data Reviewed  Labs: ordered. Decision-making details documented in ED Course.  Radiology: ordered. Decision-making details documented in ED Course.  ECG/medicine tests: ordered.     Details: EKG NSR, rate 88, no acute ST elevation.  No T-wave inversion.  No STEMI.  EKG reviewed by attending, Dr. Ahumada    Risk  Prescription drug management.              Attending Attestation:     Physician Attestation Statement for NP/PA:   I personally made/approved the management plan and take responsibility for the patient management.    Other NP/PA Attestation Additions:      Medical Decision Making: Agree with assessment and plan.             ED Course as of 04/16/24 1623   Tue Apr 16, 2024   1323 POCT glucose  WNL [KS]   1323 CBC auto differential(!)  No elevation in WBC.  Stable H/H.  [KS]   1336 CK(!)  Mildly elevated 213 [KS]   1336 NT-Pro Natriuretic Peptide  Unremarkable [KS]   1336 Magnesium  WNL [KS]   1337 Comprehensive metabolic panel(!)  Slight elevation LFTs.  No significant electrolyte abnormality.  Creatinine 1.3, just slightly higher than baseline 1.1 [KS]   1338 X-Ray Chest 1 View  No acute cardiopulmonary findings per Radiology review [KS]   1338 Orthostatics unremarkable [KS]   1351 D dimer, quantitative(!)  Elevated 0.65.  Will proceed with PE study [KS]   1351 Troponin I  WNL [KS]   1416 Urinalysis, Reflex to Urine Culture Urine, Clean Catch(!)  Unremarkable [KS]   1558 CTA Chest Non-Coronary (PE Studies)  PE study per Radiology review showing no evidence of pulmonary embolism with no acute cardiopulmonary findings.  Radiology does mention concern for possible T12-L1 diskitis. [KS]   1559 Concern for possible T12-L1 diskitis were further discussed and reviewed by neurosurgeon, Dr. Sami Sheridan, who has reviewed images and agrees to low likelihood of diskitis and likely postoperative findings.  He did recommend outpatient follow-up with his neurosurgeon. [KS]   1621  Patient was reassessed and continues to rest comfortably with normal vitals.  He states his symptoms have resolved and he has remained comfortable appearing.  Stable for discharge with very close outpatient follow-up.  ED return precautions were discussed at length.  Patient states his understanding and agrees with plan [KS]   1621 Patient discussed with attending, Dr. Ahumada, who agrees with ED course and dispo. [KS]      ED Course User Index  [KS] Dalton Leger PA-C                           Clinical Impression:  Final diagnoses:  [R55] Syncope          ED Disposition Condition    Discharge Stable          ED Prescriptions    None       Follow-up Information       Follow up With Specialties Details Why Contact Info    Elpidio Garland MD Family Medicine   [address]   [facility] [id number]  [phone]               Dalton Leger PA-C  04/16/24 1627       Dalton Leger PA-C  04/16/24 [id number]       Fareed Ahumada MD  04/16/24 3346

## 2024-04-16 NOTE — DISCHARGE INSTRUCTIONS

## 2024-10-02 ENCOUNTER — TELEPHONE (OUTPATIENT)
Dept: ORTHOPEDICS | Facility: CLINIC | Age: 59
End: 2024-10-02
Payer: COMMERCIAL

## 2024-10-02 DIAGNOSIS — M25.531 RIGHT WRIST PAIN: Primary | ICD-10-CM

## 2024-10-03 ENCOUNTER — HOSPITAL ENCOUNTER (OUTPATIENT)
Dept: RADIOLOGY | Facility: HOSPITAL | Age: 59
Discharge: HOME | End: 2024-10-03
Attending: ORTHOPAEDIC SURGERY
Payer: COMMERCIAL

## 2024-10-03 ENCOUNTER — OFFICE VISIT (OUTPATIENT)
Dept: ORTHOPEDICS | Facility: CLINIC | Age: 59
End: 2024-10-03
Payer: COMMERCIAL

## 2024-10-03 DIAGNOSIS — M25.531 RIGHT WRIST PAIN: ICD-10-CM

## 2024-10-03 DIAGNOSIS — M19.039 WRIST ARTHRITIS: Primary | ICD-10-CM

## 2024-10-03 PROCEDURE — 99999 PR PBB SHADOW E&M-EST. PATIENT-LVL III: CPT | Mod: PBBFAC,,, | Performed by: ORTHOPAEDIC SURGERY

## 2024-10-03 PROCEDURE — 73110 X-RAY EXAM OF WRIST: CPT | Mod: TC,PN,RT

## 2024-10-03 PROCEDURE — 73110 X-RAY EXAM OF WRIST: CPT | Mod: 26,RT,, | Performed by: RADIOLOGY

## 2024-10-03 RX ORDER — TRIAMCINOLONE ACETONIDE 40 MG/ML
20 INJECTION, SUSPENSION INTRA-ARTICULAR; INTRAMUSCULAR
Status: COMPLETED | OUTPATIENT
Start: 2024-10-03 | End: 2024-10-03

## 2024-10-03 RX ADMIN — TRIAMCINOLONE ACETONIDE 20 MG: 40 INJECTION, SUSPENSION INTRA-ARTICULAR; INTRAMUSCULAR at 11:10

## 2024-10-03 NOTE — PROGRESS NOTES
Subjective:      Patient ID: Navdeep Rodriguez is a 59 y.o. male.    Chief Complaint: Pain of the Right Wrist and Consult      HPI  Navdeep Rodriguez is a  59 y.o. male presenting today for right wrist pain.  There was not a history of trauma.  Onset of symptoms began several months ago although he has wrist problems in the past   No numbness or tingling reported   No history of trauma   .      Review of patient's allergies indicates:  No Known Allergies      Current Outpatient Medications   Medication Sig Dispense Refill    aspirin (ECOTRIN) 81 MG EC tablet Take 81 mg by mouth once daily.      colchicine (COLCRYS) 0.6 mg tablet Take 0.6 mg by mouth 2 (two) times daily.      cyanocobalamin 2000 MCG tablet Take 2,000 mcg by mouth once daily.      folic acid (FOLVITE) 1 MG tablet Take 1 mg by mouth once daily.      gabapentin (NEURONTIN) 300 MG capsule Take 1,200 mg by mouth every evening. 4 capsules      ketoconazole (NIZORAL) 2 % shampoo ketoconazole 2 % shampoo   WASH SCALP EVERY OTHER DAY -- WAIT 10 MINUTES BEFORE RINSING      losartan (COZAAR) 50 MG tablet Take 50 mg by mouth once daily.      methocarbamoL (ROBAXIN) 750 MG Tab Take 750 mg by mouth 3 (three) times daily as needed (muscle spasms).      omega-3 acid ethyl esters (LOVAZA) 1 gram capsule Take 1 g by mouth once daily.      pentoxifylline (TRENTAL) 400 mg TbSR Take 400 mg by mouth 2 (two) times daily.      pravastatin (PRAVACHOL) 80 MG tablet Take 80 mg by mouth once daily.      prednisoLONE acetate (PRED FORTE) 1 % DrpS Place into both eyes.      tadalafiL (CIALIS) 5 MG tablet Take 5 mg by mouth once daily.      testosterone cypionate (DEPO-TESTOSTERONE) 200 mg/mL injection Inject 2 mLs into the muscle every 28 days.      triamterene-hydrochlorothiazide 75-50 mg (MAXZIDE) 75-50 mg per tablet Take 1 tablet by mouth once daily.      cyclobenzaprine (FLEXERIL) 10 MG tablet Take 10 mg by mouth every evening. (Patient not taking: Reported on 10/3/2024)      naproxen  (NAPROSYN) 500 MG tablet Take 500 mg by mouth 2 (two) times daily.       Current Facility-Administered Medications   Medication Dose Route Frequency Provider Last Rate Last Admin    collagenase injection 0.58 mg  0.58 mg Intra-Lesional 1 time in Clinic/HOD Gregory Hazel MD           Past Medical History:   Diagnosis Date    Arthritis     Asthma     Hypertension        Past Surgical History:   Procedure Laterality Date    APPENDECTOMY      BACK SURGERY      nerve block    EYE SURGERY      muscle lining the eyes    KNEE SURGERY      ROTATOR CUFF REPAIR         Review of Systems:  ROS    OBJECTIVE:     PHYSICAL EXAM:       Vitals:    10/03/24 1117   PainSc:   6   PainLoc: Wrist     Well developed, well nourished male in no acute distress  Alert and oriented x 3  HEENT- Normal exam  Lungs- Clear to auscultation  Heart- Regular rate and rhythm  Abdomen- Soft nontender  Extremity exam- examination right wrist there is some swelling mild diffuse tenderness   Range of motion slightly decreased due to pain   Range of motion fingers full Tinel sign negative sensation intact    strength slightly decreased    RADIOG x-ray right wrist shows arthritic changes throughout the carpus and cystic formation which would be characteristic of gouty arthritis RAPHS:  AP lateral  Comments: I have personally reviewed the imaging and I agree with the above radiologist's report.    ASSESSMENT/PLAN:     IMPRESSION:  Probable gouty arthritis severe right wrist    PLAN:  I explained the nature of the problem to the patient   Recommended injection today   After pause for time-out identified the right wrist injected dorsally with combination Kenalog 20 mg 0.5 cc xylocaine sterile technique   Tolerated the procedure well without complication   I have also given him a wrist brace for part-time use   In the future he may benefit from surgery for proximal row carpectomy   Follow-up 6-8 weeks       - We talked at length about the anatomy and  pathophysiology of   Encounter Diagnosis   Name Primary?    Wrist arthritis Yes           Disclaimer: This note has been generated using voice-recognition software. There may be typographical errors that have been missed during proof-reading.

## 2024-10-28 ENCOUNTER — OFFICE VISIT (OUTPATIENT)
Dept: ORTHOPEDICS | Facility: CLINIC | Age: 59
End: 2024-10-28
Payer: COMMERCIAL

## 2024-10-28 VITALS — HEIGHT: 67 IN | BODY MASS INDEX: 36.02 KG/M2

## 2024-10-28 DIAGNOSIS — M19.039 WRIST ARTHRITIS: Primary | ICD-10-CM

## 2024-10-28 PROCEDURE — 3008F BODY MASS INDEX DOCD: CPT | Mod: CPTII,S$GLB,, | Performed by: ORTHOPAEDIC SURGERY

## 2024-10-28 PROCEDURE — 1159F MED LIST DOCD IN RCRD: CPT | Mod: CPTII,S$GLB,, | Performed by: ORTHOPAEDIC SURGERY

## 2024-10-28 PROCEDURE — 4010F ACE/ARB THERAPY RXD/TAKEN: CPT | Mod: CPTII,S$GLB,, | Performed by: ORTHOPAEDIC SURGERY

## 2024-10-28 PROCEDURE — 99999 PR PBB SHADOW E&M-EST. PATIENT-LVL III: CPT | Mod: PBBFAC,,, | Performed by: ORTHOPAEDIC SURGERY

## 2024-10-28 PROCEDURE — 99213 OFFICE O/P EST LOW 20 MIN: CPT | Mod: S$GLB,,, | Performed by: ORTHOPAEDIC SURGERY

## 2024-10-28 RX ORDER — DICLOFENAC SODIUM 50 MG/1
50 TABLET, DELAYED RELEASE ORAL 2 TIMES DAILY WITH MEALS
Qty: 60 TABLET | Refills: 3 | Status: SHIPPED | OUTPATIENT
Start: 2024-10-28

## 2024-12-16 ENCOUNTER — OFFICE VISIT (OUTPATIENT)
Dept: ORTHOPEDICS | Facility: CLINIC | Age: 59
End: 2024-12-16
Payer: COMMERCIAL

## 2024-12-16 VITALS — HEIGHT: 67 IN | BODY MASS INDEX: 36.02 KG/M2

## 2024-12-16 DIAGNOSIS — M19.039 WRIST ARTHRITIS: Primary | ICD-10-CM

## 2024-12-16 PROCEDURE — 3008F BODY MASS INDEX DOCD: CPT | Mod: CPTII,S$GLB,, | Performed by: ORTHOPAEDIC SURGERY

## 2024-12-16 PROCEDURE — 99213 OFFICE O/P EST LOW 20 MIN: CPT | Mod: S$GLB,,, | Performed by: ORTHOPAEDIC SURGERY

## 2024-12-16 PROCEDURE — 99999 PR PBB SHADOW E&M-EST. PATIENT-LVL III: CPT | Mod: PBBFAC,,, | Performed by: ORTHOPAEDIC SURGERY

## 2024-12-16 PROCEDURE — 1159F MED LIST DOCD IN RCRD: CPT | Mod: CPTII,S$GLB,, | Performed by: ORTHOPAEDIC SURGERY

## 2024-12-16 PROCEDURE — 4010F ACE/ARB THERAPY RXD/TAKEN: CPT | Mod: CPTII,S$GLB,, | Performed by: ORTHOPAEDIC SURGERY

## 2024-12-16 NOTE — PROGRESS NOTES
Subjective:      Patient ID: Navdeep Rodriguez is a 59 y.o. male.  Chief Complaint: Follow-up (Right wrist )      HPI  Navdeep Rodriguez is a  59 y.o. male presenting today for follow up of arthritis and gout.  He reports that he is much improved after the injection last visit   Pain is minimal   Does take colchicine but I want him to check with his urologist about that maybe primary care as well.    Review of patient's allergies indicates:  No Known Allergies      Current Outpatient Medications   Medication Sig Dispense Refill    aspirin (ECOTRIN) 81 MG EC tablet Take 81 mg by mouth once daily.      colchicine (COLCRYS) 0.6 mg tablet Take 0.6 mg by mouth 2 (two) times daily.      cyanocobalamin 2000 MCG tablet Take 2,000 mcg by mouth once daily.      cyclobenzaprine (FLEXERIL) 10 MG tablet Take 10 mg by mouth every evening.      diclofenac (VOLTAREN) 50 MG EC tablet Take 1 tablet (50 mg total) by mouth 2 (two) times daily with meals. 60 tablet 3    folic acid (FOLVITE) 1 MG tablet Take 1 mg by mouth once daily.      gabapentin (NEURONTIN) 300 MG capsule Take 1,200 mg by mouth every evening. 4 capsules      ketoconazole (NIZORAL) 2 % shampoo ketoconazole 2 % shampoo   WASH SCALP EVERY OTHER DAY -- WAIT 10 MINUTES BEFORE RINSING      losartan (COZAAR) 50 MG tablet Take 50 mg by mouth once daily.      methocarbamoL (ROBAXIN) 750 MG Tab Take 750 mg by mouth 3 (three) times daily as needed (muscle spasms).      omega-3 acid ethyl esters (LOVAZA) 1 gram capsule Take 1 g by mouth once daily.      pentoxifylline (TRENTAL) 400 mg TbSR Take 400 mg by mouth 2 (two) times daily.      pravastatin (PRAVACHOL) 80 MG tablet Take 80 mg by mouth once daily.      tadalafiL (CIALIS) 5 MG tablet Take 5 mg by mouth once daily.      testosterone cypionate (DEPO-TESTOSTERONE) 200 mg/mL injection Inject 2 mLs into the muscle every 28 days.      triamterene-hydrochlorothiazide 75-50 mg (MAXZIDE) 75-50 mg per tablet Take 1 tablet by mouth once daily.    "   naproxen (NAPROSYN) 500 MG tablet Take 500 mg by mouth 2 (two) times daily.      prednisoLONE acetate (PRED FORTE) 1 % DrpS Place into both eyes. (Patient not taking: Reported on 12/16/2024)       Current Facility-Administered Medications   Medication Dose Route Frequency Provider Last Rate Last Admin    collagenase injection 0.58 mg  0.58 mg Intra-Lesional 1 time in Clinic/HOD Gregory Hazel MD           Past Medical History:   Diagnosis Date    Arthritis     Asthma     Hypertension        Past Surgical History:   Procedure Laterality Date    APPENDECTOMY      BACK SURGERY      nerve block    EYE SURGERY      muscle lining the eyes    KNEE SURGERY      ROTATOR CUFF REPAIR         OBJECTIVE:   PHYSICAL EXAM:  Height: 5' 7" (170.2 cm)    Vitals:    12/16/24 0854   Height: 5' 7" (1.702 m)   PainSc:   4   PainLoc: Wrist     Ortho/SPM Exam  Examination right wrist no swelling no tenderness range of motion almost full  strength is good sensation intact    RADIOGRAPHS:  None  Comments: I have personally reviewed the imaging and I agree with the above radiologist's report.    ASSESSMENT/PLAN:     IMPRESSION:  Right wrist arthritis and gout    PLAN:  Continue wrist brace as needed continue current medications       FOLLOW UP:  2-3 months or as needed    Disclaimer: This note has been generated using voice-recognition software. There may be typographical errors that have been missed during proof-reading.     "

## 2025-03-06 ENCOUNTER — OFFICE VISIT (OUTPATIENT)
Dept: ORTHOPEDICS | Facility: CLINIC | Age: 60
End: 2025-03-06
Payer: COMMERCIAL

## 2025-03-06 VITALS — BODY MASS INDEX: 36.02 KG/M2 | HEIGHT: 67 IN

## 2025-03-06 DIAGNOSIS — M19.039 WRIST ARTHRITIS: Primary | ICD-10-CM

## 2025-03-06 PROCEDURE — 99999 PR PBB SHADOW E&M-EST. PATIENT-LVL III: CPT | Mod: PBBFAC,,, | Performed by: ORTHOPAEDIC SURGERY

## 2025-03-06 RX ORDER — TRIAMCINOLONE ACETONIDE 40 MG/ML
20 INJECTION, SUSPENSION INTRA-ARTICULAR; INTRAMUSCULAR
Status: COMPLETED | OUTPATIENT
Start: 2025-03-06 | End: 2025-03-06

## 2025-03-06 RX ADMIN — TRIAMCINOLONE ACETONIDE 20 MG: 40 INJECTION, SUSPENSION INTRA-ARTICULAR; INTRAMUSCULAR at 09:03

## 2025-03-06 NOTE — PROGRESS NOTES
"Subjective:      Patient ID: Navdeep Rodriguez is a 59 y.o. male.  Chief Complaint: Follow-up and Pain of the Right Wrist      HPI  Navdeep Rodriguez is a  59 y.o. male presenting today for follow up of gouty arthritis of the right wrist.  He reports that he is having a flare-up in the wrist with pain mainly localized to the ulnar side of the wrist   No numbness or tingling reported.    Review of patient's allergies indicates:  No Known Allergies      Current Medications[1]    Past Medical History:   Diagnosis Date    Arthritis     Asthma     Hypertension        Past Surgical History:   Procedure Laterality Date    APPENDECTOMY      BACK SURGERY      nerve block    EYE SURGERY      muscle lining the eyes    KNEE SURGERY      ROTATOR CUFF REPAIR         OBJECTIVE:   PHYSICAL EXAM:  Height: 5' 7" (170.2 cm)    Vitals:    03/06/25 0850   Height: 5' 7" (1.702 m)   PainSc:   4   PainLoc: Wrist     Ortho/SPM Exam  Examination right wrist mild swelling is noted tenderness dorsally and over the TFC   Range of motion slightly decreased range of motion fingers full   No triggering   Tinel sign negative    RADIOGRAPHS:  Previous x-rays show moderate to severe degenerative changes right  Comments: I have personally reviewed the imaging and I agree with the above radiologist's report.    ASSESSMENT/PLAN:     IMPRESSION:  Gouty arthritis right wrist    PLAN:  Patient would like injection today   After pause for time-out identified the right wrist injected ulnar carpal joint combination Kenalog 20 mg 0.5 cc xylocaine sterile technique  Tolerated the procedure well without complication   Continue gout medications and wrist brace part-time use    FOLLOW UP:  2-3 months    Disclaimer: This note has been generated using voice-recognition software. There may be typographical errors that have been missed during proof-reading.          [1]   Current Outpatient Medications   Medication Sig Dispense Refill    aspirin (ECOTRIN) 81 MG EC tablet Take 81 " mg by mouth once daily.      colchicine (COLCRYS) 0.6 mg tablet Take 0.6 mg by mouth 2 (two) times daily.      cyanocobalamin 2000 MCG tablet Take 2,000 mcg by mouth once daily.      cyclobenzaprine (FLEXERIL) 10 MG tablet Take 10 mg by mouth every evening.      diclofenac (VOLTAREN) 50 MG EC tablet Take 1 tablet (50 mg total) by mouth 2 (two) times daily with meals. 60 tablet 3    folic acid (FOLVITE) 1 MG tablet Take 1 mg by mouth once daily.      gabapentin (NEURONTIN) 300 MG capsule Take 1,200 mg by mouth every evening. 4 capsules      ketoconazole (NIZORAL) 2 % shampoo ketoconazole 2 % shampoo   WASH SCALP EVERY OTHER DAY -- WAIT 10 MINUTES BEFORE RINSING      losartan (COZAAR) 50 MG tablet Take 50 mg by mouth once daily.      methocarbamoL (ROBAXIN) 750 MG Tab Take 750 mg by mouth 3 (three) times daily as needed (muscle spasms).      omega-3 acid ethyl esters (LOVAZA) 1 gram capsule Take 1 g by mouth once daily.      pentoxifylline (TRENTAL) 400 mg TbSR Take 400 mg by mouth 2 (two) times daily.      pravastatin (PRAVACHOL) 80 MG tablet Take 80 mg by mouth once daily.      prednisoLONE acetate (PRED FORTE) 1 % DrpS Place into both eyes.      tadalafiL (CIALIS) 5 MG tablet Take 5 mg by mouth once daily.      testosterone cypionate (DEPO-TESTOSTERONE) 200 mg/mL injection Inject 2 mLs into the muscle every 28 days.      triamterene-hydrochlorothiazide 75-50 mg (MAXZIDE) 75-50 mg per tablet Take 1 tablet by mouth once daily.      naproxen (NAPROSYN) 500 MG tablet Take 500 mg by mouth 2 (two) times daily.       Current Facility-Administered Medications   Medication Dose Route Frequency Provider Last Rate Last Admin    collagenase injection 0.58 mg  0.58 mg Intra-Lesional 1 time in Clinic/HOD Gregory Hazel MD

## 2025-06-02 ENCOUNTER — TELEPHONE (OUTPATIENT)
Dept: ORTHOPEDICS | Facility: CLINIC | Age: 60
End: 2025-06-02
Payer: MEDICARE

## 2025-06-05 ENCOUNTER — TELEPHONE (OUTPATIENT)
Dept: ORTHOPEDICS | Facility: CLINIC | Age: 60
End: 2025-06-05
Payer: MEDICARE

## 2025-06-10 ENCOUNTER — TELEPHONE (OUTPATIENT)
Dept: ORTHOPEDICS | Facility: CLINIC | Age: 60
End: 2025-06-10
Payer: MEDICARE

## 2025-06-10 NOTE — TELEPHONE ENCOUNTER
----- Message from Jerzy sent at 6/10/2025 10:12 AM CDT -----  .Type:  Sooner Apoointment RequestCaller is requesting a sooner appointment.  Caller declined first available appointment listed below.  Caller will not accept being placed on the waitlist and is requesting a message be sent to doctor.Name of Caller:ptWhen is the first available appointment?7/14Symptoms:severe painWould the patient rather a call back or a response via [employer]? Call [facility] Call Back Number:[phone] Information:

## 2025-06-23 ENCOUNTER — OFFICE VISIT (OUTPATIENT)
Dept: ORTHOPEDICS | Facility: CLINIC | Age: 60
End: 2025-06-23
Payer: COMMERCIAL

## 2025-06-23 DIAGNOSIS — M25.531 RIGHT WRIST PAIN: ICD-10-CM

## 2025-06-23 DIAGNOSIS — M19.031 ARTHRITIS OF RIGHT WRIST: Primary | ICD-10-CM

## 2025-06-23 PROCEDURE — 99999 PR PBB SHADOW E&M-EST. PATIENT-LVL III: CPT | Mod: PBBFAC,,,

## 2025-06-23 PROCEDURE — 1159F MED LIST DOCD IN RCRD: CPT | Mod: CPTII,S$GLB,,

## 2025-06-23 PROCEDURE — 4010F ACE/ARB THERAPY RXD/TAKEN: CPT | Mod: CPTII,S$GLB,,

## 2025-06-23 PROCEDURE — 1160F RVW MEDS BY RX/DR IN RCRD: CPT | Mod: CPTII,S$GLB,,

## 2025-06-23 PROCEDURE — 20605 DRAIN/INJ JOINT/BURSA W/O US: CPT | Mod: RT,S$GLB,,

## 2025-06-23 PROCEDURE — 3066F NEPHROPATHY DOC TX: CPT | Mod: CPTII,S$GLB,,

## 2025-06-23 PROCEDURE — 99214 OFFICE O/P EST MOD 30 MIN: CPT | Mod: 25,S$GLB,,

## 2025-06-23 RX ORDER — OXYCODONE AND ACETAMINOPHEN 5; 325 MG/1; MG/1
1 TABLET ORAL EVERY 4 HOURS PRN
COMMUNITY

## 2025-06-23 RX ADMIN — TRIAMCINOLONE ACETONIDE 20 MG: 40 INJECTION, SUSPENSION INTRA-ARTICULAR; INTRAMUSCULAR at 08:06

## 2025-06-23 NOTE — PROGRESS NOTES
Subjective:      Patient ID: Navdeep Rodriguez is a 59 y.o. male.    Chief Complaint: Pain of the Right Wrist      HPI: Navdeep Rodriguez is a 59 y.o. right hand dominant male who presents to clinic for follow up of gouty arthritis of right wrist. Patient was last seen by Dr. Berman on 3/6/2025 for this and corticosteroid injection was performed. Patient reports symptoms returned around 1 month ago. He denies a history of trauma, but symptoms have been present for years. He reports that the pain is a 8/10 pain today. The pain is aggravated by cold temperatures and use of the hand/wrist. Denies numbness, tingling, radiation. Previous treatments include: CSI, NSAIDs, Tylenol, activity modification with some temporary relief.  The pain is affecting ADLs and limiting desired level of activity.     He has received good relief with right wrist injection in the past (last injection on 3/6/2025) and is requesting repeat cortisone injection today in the right wrist.    PAST MEDICAL HISTORY:    Past Medical History:   Diagnosis Date    Arthritis     Asthma     Hypertension      MEDICATIONS:   Current Medications[1]    ALLERGIES:   Review of patient's allergies indicates:  No Known Allergies    Review of Systems:  Constitution: Negative for chills, fever and night sweats.   HENT: Negative for congestion and headaches.    Eyes: Negative for blurred vision or vision loss.  Cardiovascular: Negative for chest pain and syncope.   Respiratory: Negative for cough and shortness of breath.    Endocrine: Negative for polydipsia, polyphagia and polyuria.   Hematologic/Lymphatic: Negative for bleeding problem. Does not bruise/bleed easily.   Skin: Negative for dry skin, itching and rash.   Musculoskeletal: See HPI.   Gastrointestinal: Negative for abdominal pain and bowel incontinence.   Genitourinary: Negative for bladder incontinence and nocturia.   Neurological: Negative for disturbances in coordination, loss of balance and seizures.    Psychiatric/Behavioral: Negative for depression. The patient does not have insomnia.    Allergic/Immunologic: Negative for hives and persistent infections.          Objective:      There were no vitals filed for this visit.    PHYSICAL EXAM:  General: Alert & oriented x3, well-developed and well-nourished, in no acute distress, sitting comfortably in the exam room.  Skin: Warm and dry. Capillary refill less than 2 seconds.   Head: Normocephalic and atraumatic.   Eyes: Sclera appear normal.   Nose: No deformities seen.   Ears: No deformities seen.   Neck: No tracheal deviation present.   Pulmonary/Chest: Breathing unlabored.   Neurological: Alert and oriented to person, place, and time.   Psychiatric: Mood is pleasant and affect appropriate.     RIGHT HAND/WRIST:  Observation/Inspection:   No evidence of visible deformity, swelling, discoloration, scars, or atrophy.  Palpation:    Tenderness to palpation dorsally and over the TFC.   Range of Motion:   Wrist: Limited to wrist flexion, extension, radial, and ulnar deviation.  Digits: Full to digit MCP, PIP, and DIP flexion and extension without pain or difficulty.  No triggering.  Special Tests:   Tinel's Test - Carpal Tunnel Negative    Neurovascular Exam:   Digits warm and well perfused, brisk capillary refill <3 seconds throughout.  NVI motor/LTS to median, radial, and ulnar nerves, radial pulse 2+.    Imaging:   X-Rays: 3 views of right wrist dated 10/03/2024, and independently reviewed, show: Severe radiolunate osteoarthritis with resulting bone-on-bone articulation and prominent associated subchondral sclerosis and cystic change. Component of avascular necrosis of the lunate cannot be excluded. Distal radioulnar osteoarthritis. No acute displaced fractures. No subluxation or dislocation. Well corticated osseous body adjacent to the dorsal/lateral aspect of the triquetrum, possibly sequela of remote trauma.       Assessment:       1. Arthritis of right wrist     2. Right wrist pain        Plan:       Orders Placed This Encounter    Intermediate Joint Aspiration/Injection     I explained the nature of the problem to the patient.     I discussed at length with the patient all the different treatment options available for his right wrist including anti-inflammatories, acetaminophen, rest, ice, heat, physical/occupational therapy, and corticosteroid injections. I explained the potential role of surgery in the treatment of this condition. The patient understands that if non-surgical measures do not adequately control symptoms, surgery will be considered in the future.     Patient states he understands he will likely need surgery in the future. Patient states he would like to continue to treat with occasional corticosteroid injections until they no longer provide relief. I am in agreement with this plan.     Medications:  Continue OTC tylenol and/or NSAIDs as needed for pain management.   Procedures:  Corticosteroid injection requested and performed today to the right wrist joint. See procedure note. Standard precautions provided.    Pain Management:  Encouraged patient to apply ice and/or heat compress to the affected area 2-3x a day for 15-20 minutes as needed for pain management.      Follow-Up: 3 months for follow up.     All of the patient's questions were answered and the patient will contact us if they have any questions or concerns in the interim.    Kathy Calderon PA-C  [facility]  Orthopedic Surgery    Medical Dictation software was used during the dictation of portions or the entirety of this medical record.  Phonetic or grammatic errors may exist due to the use of this software. For clarification, refer to the author of the document.             [1]   Current Outpatient Medications:     aspirin (ECOTRIN) 81 MG EC tablet, Take 81 mg by mouth once daily., Disp: , Rfl:     colchicine (COLCRYS) 0.6 mg tablet, Take 0.6 mg by mouth 2 (two) times daily., Disp: ,  Rfl:     cyanocobalamin 2000 MCG tablet, Take 2,000 mcg by mouth once daily., Disp: , Rfl:     cyclobenzaprine (FLEXERIL) 10 MG tablet, Take 10 mg by mouth every evening., Disp: , Rfl:     folic acid (FOLVITE) 1 MG tablet, Take 1 mg by mouth once daily., Disp: , Rfl:     glucosamine/chondr baumann A sod (GLUCOSAMINE-CHONDROITIN) 1,500-1,200 mg/30 mL Liqd, Take by mouth., Disp: , Rfl:     ketoconazole (NIZORAL) 2 % shampoo, ketoconazole 2 % shampoo  WASH SCALP EVERY OTHER DAY -- WAIT 10 MINUTES BEFORE RINSING, Disp: , Rfl:     losartan (COZAAR) 50 MG tablet, Take 50 mg by mouth once daily., Disp: , Rfl:     methocarbamoL (ROBAXIN) 750 MG Tab, Take 750 mg by mouth 3 (three) times daily as needed (muscle spasms)., Disp: , Rfl:     methotrexate 2.5 MG Tab, Take 2.5 mg by mouth every 7 days. Take 7 tablet pot q 7, Disp: , Rfl:     omega-3 acid ethyl esters (LOVAZA) 1 gram capsule, Take 1 g by mouth once daily., Disp: , Rfl:     oxyCODONE-acetaminophen (PERCOCET) 5-325 mg per tablet, Take 1 tablet by mouth every 4 (four) hours as needed for Pain., Disp: , Rfl:     pentoxifylline (TRENTAL) 400 mg TbSR, Take 400 mg by mouth 2 (two) times daily., Disp: , Rfl:     pravastatin (PRAVACHOL) 80 MG tablet, Take 80 mg by mouth once daily., Disp: , Rfl:     prednisoLONE acetate (PRED FORTE) 1 % DrpS, Place into both eyes., Disp: , Rfl:     tadalafiL (CIALIS) 5 MG tablet, Take 5 mg by mouth once daily., Disp: , Rfl:     testosterone cypionate (DEPO-TESTOSTERONE) 200 mg/mL injection, Inject 2 mLs into the muscle every 28 days., Disp: , Rfl:     triamcinolone acetonide 0.025% (KENALOG) 0.025 % Oint, Apply topically 2 (two) times daily., Disp: , Rfl:     triamterene-hydrochlorothiazide 75-50 mg (MAXZIDE) 75-50 mg per tablet, Take 1 tablet by mouth once daily., Disp: , Rfl:     Current Facility-Administered Medications:     collagenase injection 0.58 mg, 0.58 mg, Intra-Lesional, 1 time in Clinic/HOD, Gregory Hazel MD

## 2025-06-24 ENCOUNTER — HOSPITAL ENCOUNTER (OUTPATIENT)
Dept: RADIOLOGY | Facility: HOSPITAL | Age: 60
Discharge: HOME | End: 2025-06-24
Attending: NURSE PRACTITIONER
Payer: COMMERCIAL

## 2025-06-24 DIAGNOSIS — N18.32 STAGE 3B CHRONIC KIDNEY DISEASE: ICD-10-CM

## 2025-06-24 PROCEDURE — 76770 US EXAM ABDO BACK WALL COMP: CPT | Mod: 26,,, | Performed by: STUDENT IN AN ORGANIZED HEALTH CARE EDUCATION/TRAINING PROGRAM

## 2025-06-24 PROCEDURE — 76770 US EXAM ABDO BACK WALL COMP: CPT | Mod: TC,PN

## 2025-06-24 RX ORDER — TRIAMCINOLONE ACETONIDE 40 MG/ML
20 INJECTION, SUSPENSION INTRA-ARTICULAR; INTRAMUSCULAR
Status: DISCONTINUED | OUTPATIENT
Start: 2025-06-23 | End: 2025-06-24 | Stop reason: HOSPADM

## 2025-06-24 NOTE — PROCEDURES
Intermediate Joint Aspiration/Injection    Date/Time: 6/23/2025 8:30 AM    Performed by: Kathy Calderon PA-C  Authorized by: Kathy Calderon PA-C    Indications:  Arthritis and pain  Site marked: The procedure site was marked    Timeout: Prior to procedure the correct patient, procedure, and site was verified      Location:  Wrist    Local anesthesia used?: Yes    Local anesthetic:  Topical anesthetic  Wrist joint: R ulnar-carpal.  Ultrasonic Guidance for needle placement: No  Needle size:  25 G  Approach: ulnar.  Medications:  20 mg triamcinolone acetonide 40 mg/mL  Patient tolerance:  Patient tolerated the procedure well with no immediate complications      Injection Procedure Note   Prior to procedure the correct patient, procedure, and site was verified. Allergies were reviewed and verbal consent was obtained. The procedure site was marked.    After time out was performed, the patient was prepped aseptically with betadine, the area was sprayed with local topical anesthetic, and then cleaned again with betadine. A therapeutic injection of combination of 0.5cc 1% Xylocaine and 20mg Triamcinolone was given under sterile technique using a 25-gauge x 5/8 needle in the right wrist, ulnar carpal approach. Sterile dressing applied.     Patient tolerated the procedure well. Pain decreased. He had no adverse reactions to the medication.     The patient is cautioned that immediate relief of pain is secondary to the local anesthetic and will be temporary. After the anesthetic wears off there may be a increase in pain that may last for a few hours or a few days. Advised patient to avoid strenuous activities for the next 24-48 hours and to apply ice for 20 minutes to help alleviate this this pain. He was warned of possible blood sugar and/or blood pressure changes following injection. He was reminded to call the clinic immediately for any adverse side effects as explained in clinic today.

## 2025-07-09 ENCOUNTER — LAB VISIT (OUTPATIENT)
Dept: LAB | Facility: HOSPITAL | Age: 60
End: 2025-07-09
Attending: PODIATRIST
Payer: COMMERCIAL

## 2025-07-09 DIAGNOSIS — G63 GOUTY NEURITIS: Primary | ICD-10-CM

## 2025-07-09 DIAGNOSIS — E55.9 AVITAMINOSIS D: ICD-10-CM

## 2025-07-09 DIAGNOSIS — M10.00 GOUTY NEURITIS: Primary | ICD-10-CM

## 2025-07-09 LAB
25(OH)D3+25(OH)D2 SERPL-MCNC: 32 NG/ML (ref 30–96)
URATE SERPL-MCNC: 6.8 MG/DL (ref 3.4–7)

## 2025-07-09 PROCEDURE — 82306 VITAMIN D 25 HYDROXY: CPT | Mod: PN

## 2025-07-09 PROCEDURE — 84550 ASSAY OF BLOOD/URIC ACID: CPT | Mod: PN

## 2025-07-22 ENCOUNTER — LAB VISIT (OUTPATIENT)
Dept: LAB | Facility: HOSPITAL | Age: 60
End: 2025-07-22
Attending: INTERNAL MEDICINE
Payer: COMMERCIAL

## 2025-07-22 DIAGNOSIS — N18.32 STAGE 3B CHRONIC KIDNEY DISEASE: ICD-10-CM

## 2025-07-22 LAB
ALBUMIN SERPL BCP-MCNC: 4.2 G/DL (ref 3.5–5.2)
ANION GAP (OHS): 6 MMOL/L (ref 8–16)
BUN SERPL-MCNC: 27 MG/DL (ref 2–20)
CALCIUM SERPL-MCNC: 9.7 MG/DL (ref 8.7–10.5)
CHLORIDE SERPL-SCNC: 104 MMOL/L (ref 95–110)
CO2 SERPL-SCNC: 31 MMOL/L (ref 23–29)
CREAT SERPL-MCNC: 1.5 MG/DL (ref 0.5–1.4)
GFR SERPLBLD CREATININE-BSD FMLA CKD-EPI: 53 ML/MIN/1.73/M2
GLUCOSE SERPL-MCNC: 90 MG/DL (ref 70–110)
MAGNESIUM SERPL-MCNC: 1.8 MG/DL (ref 1.6–2.6)
PHOSPHATE SERPL-MCNC: 3.2 MG/DL (ref 2.7–4.5)
POTASSIUM SERPL-SCNC: 4.9 MMOL/L (ref 3.5–5.1)
SODIUM SERPL-SCNC: 141 MMOL/L (ref 136–145)
URATE SERPL-MCNC: 6.2 MG/DL (ref 3.4–7)

## 2025-07-22 PROCEDURE — 36415 COLL VENOUS BLD VENIPUNCTURE: CPT | Mod: PN

## 2025-07-22 PROCEDURE — 82040 ASSAY OF SERUM ALBUMIN: CPT | Mod: PN

## 2025-07-22 PROCEDURE — 84550 ASSAY OF BLOOD/URIC ACID: CPT | Mod: PN

## 2025-07-22 PROCEDURE — 83735 ASSAY OF MAGNESIUM: CPT | Mod: PN

## 2025-07-25 ENCOUNTER — HOSPITAL ENCOUNTER (OUTPATIENT)
Dept: RADIOLOGY | Facility: HOSPITAL | Age: 60
Discharge: HOME | End: 2025-07-25
Attending: INTERNAL MEDICINE
Payer: COMMERCIAL

## 2025-07-25 DIAGNOSIS — N28.89 RENAL MASS: ICD-10-CM

## 2025-07-25 PROCEDURE — 74176 CT ABD & PELVIS W/O CONTRAST: CPT | Mod: 26,,, | Performed by: RADIOLOGY

## 2025-07-25 PROCEDURE — 74176 CT ABD & PELVIS W/O CONTRAST: CPT | Mod: TC,PN

## 2025-08-04 ENCOUNTER — OFFICE VISIT (OUTPATIENT)
Dept: ORTHOPEDICS | Facility: CLINIC | Age: 60
End: 2025-08-04
Payer: COMMERCIAL

## 2025-08-04 ENCOUNTER — HOSPITAL ENCOUNTER (OUTPATIENT)
Dept: RADIOLOGY | Facility: HOSPITAL | Age: 60
Discharge: HOME | End: 2025-08-04
Attending: NURSE PRACTITIONER
Payer: COMMERCIAL

## 2025-08-04 DIAGNOSIS — N28.89 RENAL MASS: ICD-10-CM

## 2025-08-04 DIAGNOSIS — M19.031 ARTHRITIS OF RIGHT WRIST: Primary | ICD-10-CM

## 2025-08-04 PROCEDURE — 20605 DRAIN/INJ JOINT/BURSA W/O US: CPT | Mod: RT,S$GLB,, | Performed by: ORTHOPAEDIC SURGERY

## 2025-08-04 PROCEDURE — 3060F POS MICROALBUMINURIA REV: CPT | Mod: CPTII,S$GLB,, | Performed by: ORTHOPAEDIC SURGERY

## 2025-08-04 PROCEDURE — 3066F NEPHROPATHY DOC TX: CPT | Mod: CPTII,S$GLB,, | Performed by: ORTHOPAEDIC SURGERY

## 2025-08-04 PROCEDURE — 74183 MRI ABD W/O CNTR FLWD CNTR: CPT | Mod: TC,PN

## 2025-08-04 PROCEDURE — 4010F ACE/ARB THERAPY RXD/TAKEN: CPT | Mod: CPTII,S$GLB,, | Performed by: ORTHOPAEDIC SURGERY

## 2025-08-04 PROCEDURE — 99999 PR PBB SHADOW E&M-EST. PATIENT-LVL III: CPT | Mod: PBBFAC,,, | Performed by: ORTHOPAEDIC SURGERY

## 2025-08-04 PROCEDURE — 74183 MRI ABD W/O CNTR FLWD CNTR: CPT | Mod: 26,,, | Performed by: RADIOLOGY

## 2025-08-04 PROCEDURE — 25500020 PHARM REV CODE 255: Mod: PN | Performed by: NURSE PRACTITIONER

## 2025-08-04 PROCEDURE — A9585 GADOBUTROL INJECTION: HCPCS | Mod: PN | Performed by: NURSE PRACTITIONER

## 2025-08-04 PROCEDURE — 1159F MED LIST DOCD IN RCRD: CPT | Mod: CPTII,S$GLB,, | Performed by: ORTHOPAEDIC SURGERY

## 2025-08-04 PROCEDURE — 99213 OFFICE O/P EST LOW 20 MIN: CPT | Mod: 25,S$GLB,, | Performed by: ORTHOPAEDIC SURGERY

## 2025-08-04 RX ORDER — GADOBUTROL 604.72 MG/ML
10 INJECTION INTRAVENOUS
Status: COMPLETED | OUTPATIENT
Start: 2025-08-04 | End: 2025-08-04

## 2025-08-04 RX ORDER — TRIAMCINOLONE ACETONIDE 40 MG/ML
20 INJECTION, SUSPENSION INTRA-ARTICULAR; INTRAMUSCULAR
Status: COMPLETED | OUTPATIENT
Start: 2025-08-04 | End: 2025-08-04

## 2025-08-04 RX ADMIN — GADOBUTROL 10 ML: 604.72 INJECTION INTRAVENOUS at 11:08

## 2025-08-04 RX ADMIN — TRIAMCINOLONE ACETONIDE 20 MG: 40 INJECTION, SUSPENSION INTRA-ARTICULAR; INTRAMUSCULAR at 01:08

## 2025-08-04 NOTE — PROGRESS NOTES
Subjective:      Patient ID: Navdeep Rodriguez is a 60 y.o. male.  Chief Complaint: Pain of the Right Wrist (Discuss sx )      HPI  Navdeep Rodriguez is a  60 y.o. male presenting today for follow up of right wrist arthritis related to gout.  He reports that he is having a flare-up again   Previous x-rays show severe arthritis of the proximal row   He is interested in surgery but was thinking about a small surgery   I explained to him that it would need to be a proximal row carpectomy and he would still have some arthritis at the DRUJ there is no guarantee it would relieve all of his symptoms he understands.    Review of patient's allergies indicates:   Allergen Reactions    Hydrochlorothiazide      Gout    Nsaids (non-steroidal anti-inflammatory drug)      ARF - > creat up to 2.4 --> recovered back to 1.4 after stopping         Current Medications[1]    Past Medical History:   Diagnosis Date    Arthritis     Asthma     Hypertension        Past Surgical History:   Procedure Laterality Date    APPENDECTOMY      BACK SURGERY      nerve block    EYE SURGERY      muscle lining the eyes    KNEE SURGERY      ROTATOR CUFF REPAIR         OBJECTIVE:   PHYSICAL EXAM:       Vitals:    08/04/25 1327   PainSc:   4   PainLoc: Wrist     Ortho/SPM Exam  Examination right wrist some mild tenderness dorsally slight swelling range of motion is limited to about 60° flexion and extension       RADIOGRAPHS:  As above  Comments: I have personally reviewed the imaging and I agree with the above radiologist's report.    ASSESSMENT/PLAN:     IMPRESSION:  Right wrist arthritis related to gout    PLAN:  Patient would like try another injection   After pause for time-out identified the right wrist injected ulnar carpal joint combination Kenalog 20 mg 0.5 cc xylocaine sterile technique   Tolerated the procedure well without complication   Continue current medications and use of the wrist brace  Consider surgery in the future for proximal row  carpectomy    FOLLOW UP:  3 months    Disclaimer: This note has been generated using voice-recognition software. There may be typographical errors that have been missed during proof-reading.          [1]   Current Outpatient Medications   Medication Sig Dispense Refill    allopurinoL 200 mg Tab Take 200 mg by mouth once daily. 90 tablet 3    aspirin (ECOTRIN) 81 MG EC tablet Take 81 mg by mouth once daily.      colchicine (COLCRYS) 0.6 mg tablet Take 0.6 mg by mouth 2 (two) times daily.      cyanocobalamin 2000 MCG tablet Take 2,000 mcg by mouth once daily.      cyclobenzaprine (FLEXERIL) 10 MG tablet Take 10 mg by mouth every evening.      folic acid (FOLVITE) 1 MG tablet Take 1 mg by mouth once daily.      glucosamine/chondr baumann A sod (GLUCOSAMINE-CHONDROITIN) 1,500-1,200 mg/30 mL Liqd Take by mouth.      ketoconazole (NIZORAL) 2 % shampoo ketoconazole 2 % shampoo   WASH SCALP EVERY OTHER DAY -- WAIT 10 MINUTES BEFORE RINSING      losartan (COZAAR) 100 MG tablet Take 1 tablet (100 mg total) by mouth once daily. 90 tablet 3    methocarbamoL (ROBAXIN) 750 MG Tab Take 750 mg by mouth 3 (three) times daily as needed (muscle spasms).      NIFEdipine (ADALAT CC) 30 MG TbSR Take 1 tablet (30 mg total) by mouth once daily. 30 tablet 11    omega-3 acid ethyl esters (LOVAZA) 1 gram capsule Take 1 g by mouth once daily.      oxyCODONE-acetaminophen (PERCOCET) 5-325 mg per tablet Take 1 tablet by mouth every 4 (four) hours as needed for Pain.      pentoxifylline (TRENTAL) 400 mg TbSR Take 400 mg by mouth 2 (two) times daily.      pravastatin (PRAVACHOL) 80 MG tablet Take 80 mg by mouth once daily.      prednisoLONE acetate (PRED FORTE) 1 % DrpS Place into both eyes.      tadalafiL (CIALIS) 5 MG tablet Take 5 mg by mouth once daily.      testosterone cypionate (DEPO-TESTOSTERONE) 200 mg/mL injection Inject 2 mLs into the muscle every 28 days.      triamcinolone acetonide 0.025% (KENALOG) 0.025 % Oint Apply topically 2 (two)  times daily.      methotrexate 2.5 MG Tab Take 2.5 mg by mouth every 7 days. Take 7 tablet pot q 7       Current Facility-Administered Medications   Medication Dose Route Frequency Provider Last Rate Last Admin    collagenase injection 0.58 mg  0.58 mg Intra-Lesional 1 time in Clinic/HOD Gregory Hazel MD

## 2025-08-06 ENCOUNTER — OFFICE VISIT (OUTPATIENT)
Dept: UROLOGY | Facility: CLINIC | Age: 60
End: 2025-08-06
Payer: COMMERCIAL

## 2025-08-06 VITALS
WEIGHT: 218.25 LBS | HEIGHT: 67 IN | HEART RATE: 64 BPM | DIASTOLIC BLOOD PRESSURE: 86 MMHG | SYSTOLIC BLOOD PRESSURE: 138 MMHG | BODY MASS INDEX: 34.26 KG/M2

## 2025-08-06 DIAGNOSIS — N28.89 RENAL MASS: Primary | ICD-10-CM

## 2025-08-06 DIAGNOSIS — N40.1 BENIGN NON-NODULAR PROSTATIC HYPERPLASIA WITH LOWER URINARY TRACT SYMPTOMS: ICD-10-CM

## 2025-08-06 PROCEDURE — 1160F RVW MEDS BY RX/DR IN RCRD: CPT | Mod: CPTII,S$GLB,, | Performed by: UROLOGY

## 2025-08-06 PROCEDURE — 1159F MED LIST DOCD IN RCRD: CPT | Mod: CPTII,S$GLB,, | Performed by: UROLOGY

## 2025-08-06 PROCEDURE — 4010F ACE/ARB THERAPY RXD/TAKEN: CPT | Mod: CPTII,S$GLB,, | Performed by: UROLOGY

## 2025-08-06 PROCEDURE — 3079F DIAST BP 80-89 MM HG: CPT | Mod: CPTII,S$GLB,, | Performed by: UROLOGY

## 2025-08-06 PROCEDURE — 3008F BODY MASS INDEX DOCD: CPT | Mod: CPTII,S$GLB,, | Performed by: UROLOGY

## 2025-08-06 PROCEDURE — 3066F NEPHROPATHY DOC TX: CPT | Mod: CPTII,S$GLB,, | Performed by: UROLOGY

## 2025-08-06 PROCEDURE — 3075F SYST BP GE 130 - 139MM HG: CPT | Mod: CPTII,S$GLB,, | Performed by: UROLOGY

## 2025-08-06 PROCEDURE — 3060F POS MICROALBUMINURIA REV: CPT | Mod: CPTII,S$GLB,, | Performed by: UROLOGY

## 2025-08-06 PROCEDURE — 99205 OFFICE O/P NEW HI 60 MIN: CPT | Mod: S$GLB,,, | Performed by: UROLOGY

## 2025-08-06 PROCEDURE — 99999 PR PBB SHADOW E&M-EST. PATIENT-LVL V: CPT | Mod: PBBFAC,,, | Performed by: UROLOGY

## 2025-08-06 RX ORDER — OXYCODONE AND ACETAMINOPHEN 7.5; 325 MG/1; MG/1
TABLET ORAL
COMMUNITY
Start: 2025-07-08

## 2025-08-06 RX ORDER — GABAPENTIN 300 MG/1
1 CAPSULE ORAL 4 TIMES DAILY
COMMUNITY
Start: 2024-02-12

## 2025-08-06 RX ORDER — TRIAMTERENE AND HYDROCHLOROTHIAZIDE 75; 50 MG/1; MG/1
TABLET ORAL
COMMUNITY

## 2025-08-06 RX ORDER — DICLOFENAC SODIUM 100 MG/1
TABLET, EXTENDED RELEASE ORAL
COMMUNITY

## 2025-08-06 RX ORDER — FLUTICASONE PROPIONATE 50 MCG
SPRAY, SUSPENSION (ML) NASAL
COMMUNITY

## 2025-08-06 RX ORDER — TAMSULOSIN HYDROCHLORIDE 0.4 MG/1
0.4 CAPSULE ORAL
Qty: 30 CAPSULE | Refills: 12 | Status: SHIPPED | OUTPATIENT
Start: 2025-08-06 | End: 2025-09-05

## 2025-08-06 NOTE — PROGRESS NOTES
Patient ID: Navdeep Rodriguez is a 60 y.o. male.    Chief Complaint: Renal mass      HPI: Navdeep Rodriguez is a 60 y.o. White male who presents today for evaluation and management of a renal mass.  History of Present Illness    Patient presents today for evaluation of a renal mass. He presents with a recently discovered 4.4 cm solid renal mass in the right kidney found on CT of the abdomen. He is currently asymptomatic related to the renal mass. He reports recent fatigue, describing feeling tired over the past period, along with recent unspecified weight loss. He denies hematuria but reports a weak urinary stream persisting for approximately 6 months, which is a new symptom for him with no prior urinary difficulties before this period. He has a history of renal function issues with fluctuating kidney function on labs. Most recent creatinine level was 1.5. He also has a diagnosis of Peyronie's disease. He is currently taking Cialis 5 mg daily. He denies prior medications for urination difficulties.       The mass was found incidentally.    The patient denies gross hematuria and/or constitutional symptoms attributable to their recently discovered renal mass.    The patient denies a personal and family history of kidney cancer.      The patient presents today to discuss how best to proceed with their right renal mass.    Reports a past surgical history of an appendectomy remotely.    Review of patient's allergies indicates:   Allergen Reactions    Hydrochlorothiazide      Gout    Nsaids (non-steroidal anti-inflammatory drug)      ARF - > creat up to 2.4 --> recovered back to 1.4 after stopping       Current Medications[1]    Past Medical History:   Diagnosis Date    Arthritis     Asthma     Hypertension        Past Surgical History:   Procedure Laterality Date    APPENDECTOMY      BACK SURGERY      nerve block    EYE SURGERY      muscle lining the eyes    KNEE SURGERY      ROTATOR CUFF REPAIR         Family History   Problem  Relation Name Age of Onset    Heart disease Father      Diabetes Brother           Review of Systems  All other systems reviewed and negative except pertinent positives noted in HPI.    Objective:     Physical Exam  Constitutional:       General: He is not in acute distress.     Appearance: He is well-developed.   HENT:      Head: Normocephalic and atraumatic.   Eyes:      General: No scleral icterus.  Neck:      Trachea: No tracheal deviation.   Pulmonary:      Effort: Pulmonary effort is normal. No respiratory distress.   Neurological:      Mental Status: He is alert and oriented to person, place, and time.   Psychiatric:         Behavior: Behavior normal.         Thought Content: Thought content normal.         Judgment: Judgment normal.       Assessment:       1. Renal mass    2. Benign non-nodular prostatic hyperplasia with lower urinary tract symptoms        Plan:     1. Renal mass    2. Benign non-nodular prostatic hyperplasia with lower urinary tract symptoms        Orders Placed This Encounter   Procedures    CT Chest Without Contrast     Standing Status:   Future     Expected Date:   8/6/2025     Expiration Date:   8/6/2026     May the Radiologist modify the order per protocol to meet the clinical needs of the patient?:   Yes     Is this a  Screening?:   No    Bladder scan    Case Request Operating Room: XI ROBOTIC NEPHRECTOMY, PARTIAL     Medical Necessity::   Medically Urgent [101]     PAT Visit Needed?:   Yes, PAT Needed [107]     Special needs:   surgery clearance, B&K ultrasound, XI, H&P clinic, type and screen.     Case classification:   E - Elective [90]     Post-Procedure Disposition::   PACU [1]     Estimated Length of Stay::   1 midnight     Expected Duration in minutes (including turnover)::   240     Is an on-site pathologist required for this procedure?:   N/A   -A post void residual bladder scan was performed immediately after voiding. The patient's PVR was noted to be  173cc.  -start flomax.         - Long talk about renal mass and it's management.  Reviewed images.Discussed options including active surveillance, biopsy, minimally invasive techniques including HFRA, cryo. Discussed open and laparascopic surgical approaches. Discussed partial and radical nephrectomy. Discussed surgery preparation, surgery, recuperation, recovery, exercise restrictions. Discussed risks, benefits, and complications. Answered questions and addressed their concerns.  -I have explained the risk, benefits, and alternatives of the procedure in detail.  The risks including but not limited to bleeding, pseudoaneurysm, AVM, injury to adjacent structures including the spleen, liver, lung, pancreas, colon and intestines, blood vessels in the abdomen including the renal artery or renal vein, ureter, loss of kidney, urinoma or urinary fistula, or need for conversion to open or radical nephrectomy were explained to the patient in depth. The patient voices understanding and all questions have been answered. The patient agrees to proceed as planned with a right robotic partial nephrectomy (pending a clear ct chest).      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  - High probability (80-90%) of malignancy given size and characteristics.  - Recommend treatment due to increased risk of metastasis above 4 cm threshold.  - Prioritized nephron-sparing approach due to fluctuating renal function.  - Considered robotic partial nephrectomy as optimal intervention for curative intent.  - Explained statistical likelihood of malignancy for solid renal masses and relationship between mass size and metastasis risk.  -  Outlined treatment options: ablation vs. surgical removal.  - Described robotic partial nephrectomy procedure and recovery timeline.  - Ordered CT Chest to check for metastatic disease.    CHRONIC KIDNEY DISEASE:  - Prioritized nephron-sparing approach due to fluctuating renal function.    POOR URINARY STREAM:  - Patient to complete symptom questionnaire for urinary symptoms.  - Started Flomax (tamsulosin) for urinary symptoms, informed about potential side effects including ejaculatory changes.  - Ordered bladder scan to check for urinary retention.    FOLLOW-UP:  - Referred to pre-op clinic for anesthesia evaluation.  - Follow up to schedule robotic partial nephrectomy pending chest CT results and pre-op clearance.       This note was generated with the assistance of ambient listening technology. Verbal consent was obtained by the patient and accompanying visitor(s) for the recording of patient appointment to facilitate this note. I attest to having reviewed and edited the generated note for accuracy, though some syntax or spelling errors may persist. Please contact the author of this note for any clarification.               [1]   Current Outpatient Medications   Medication Sig Dispense Refill    allopurinoL 200 mg Tab Take 200 mg by mouth once daily. 90 tablet 3    aspirin (ECOTRIN) 81 MG EC tablet Take 81 mg by mouth once daily.      colchicine (COLCRYS) 0.6 mg tablet Take 0.6 mg by mouth 2 (two) times daily.      cyanocobalamin 2000 MCG tablet Take 2,000 mcg by mouth once daily.      cyclobenzaprine (FLEXERIL) 10 MG tablet Take 10 mg by mouth every evening.      diclofenac sodium 100 mg 24 hr tablet       fluticasone propionate (FLONASE) 50 mcg/actuation nasal spray USE 2 SPRAYS NASALLY DAILY AS NEEDED FOR CONGESTION      folic acid (FOLVITE) 1 MG tablet Take 1 mg by mouth once daily.      gabapentin (NEURONTIN) 300 MG capsule Take 1 capsule by mouth 4 (four) times daily.      glucosamine/chondr baumann A sod  (GLUCOSAMINE-CHONDROITIN) 1,500-1,200 mg/30 mL Liqd Take by mouth.      ketoconazole (NIZORAL) 2 % shampoo ketoconazole 2 % shampoo   WASH SCALP EVERY OTHER DAY -- WAIT 10 MINUTES BEFORE RINSING      losartan (COZAAR) 100 MG tablet Take 1 tablet (100 mg total) by mouth once daily. 90 tablet 3    methocarbamoL (ROBAXIN) 750 MG Tab Take 750 mg by mouth 3 (three) times daily as needed (muscle spasms).      NIFEdipine (ADALAT CC) 30 MG TbSR Take 1 tablet (30 mg total) by mouth once daily. 30 tablet 11    omega-3 acid ethyl esters (LOVAZA) 1 gram capsule Take 1 g by mouth once daily.      oxyCODONE-acetaminophen (PERCOCET) 5-325 mg per tablet Take 1 tablet by mouth every 4 (four) hours as needed for Pain.      oxyCODONE-acetaminophen (PERCOCET) 7.5-325 mg per tablet Take 1 tablet 3 times a day by oral route as needed for 30 days, for pain.      pentoxifylline (TRENTAL) 400 mg TbSR Take 400 mg by mouth 2 (two) times daily.      pravastatin (PRAVACHOL) 80 MG tablet Take 80 mg by mouth once daily.      prednisoLONE acetate (PRED FORTE) 1 % DrpS Place into both eyes.      tadalafiL (CIALIS) 5 MG tablet Take 5 mg by mouth once daily.      testosterone cypionate (DEPO-TESTOSTERONE) 200 mg/mL injection Inject 2 mLs into the muscle every 28 days.      triamcinolone acetonide 0.025% (KENALOG) 0.025 % Oint Apply topically 2 (two) times daily.      triamterene-hydrochlorothiazide 75-50 mg (MAXZIDE) 75-50 mg per tablet       tamsulosin (FLOMAX) 0.4 mg Cap Take 1 capsule (0.4 mg total) by mouth after dinner. 30 capsule 12     Current Facility-Administered Medications   Medication Dose Route Frequency Provider Last Rate Last Admin    collagenase injection 0.58 mg  0.58 mg Intra-Lesional 1 time in Clinic/Gregory Samuels MD

## 2025-08-07 ENCOUNTER — TELEPHONE (OUTPATIENT)
Facility: HOSPITAL | Age: 60
End: 2025-08-07
Payer: COMMERCIAL

## 2025-08-07 ENCOUNTER — HOSPITAL ENCOUNTER (OUTPATIENT)
Dept: RADIOLOGY | Facility: HOSPITAL | Age: 60
Discharge: HOME | End: 2025-08-07
Attending: UROLOGY
Payer: COMMERCIAL

## 2025-08-07 DIAGNOSIS — N28.89 RENAL MASS: ICD-10-CM

## 2025-08-07 PROCEDURE — 71250 CT THORAX DX C-: CPT | Mod: 26,,, | Performed by: RADIOLOGY

## 2025-08-07 PROCEDURE — 71250 CT THORAX DX C-: CPT | Mod: TC,PN

## 2025-08-07 NOTE — TELEPHONE ENCOUNTER
----- Message from Isaac Wiseman MD sent at 8/7/2025  7:11 AM CDT -----  I don't feel strongly that we need approval to hold in this situation.  Would just go ahead and tell him to hold off on these medications.     MM  ----- Message -----  From: Pako Diego RN  Sent: 8/6/2025   3:11 PM CDT  To: MD Dr. Bowen Hopson,     I'm working on preop for Mr. Rodriguez's 8/22 XI pedro part neph. 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 Hazel I think).    Thank you,  Pako Diego RN  [facility] [place]

## 2025-08-11 ENCOUNTER — HOSPITAL ENCOUNTER (OUTPATIENT)
Dept: CARDIOLOGY | Facility: CLINIC | Age: 60
Discharge: HOME | End: 2025-08-11
Payer: COMMERCIAL

## 2025-08-11 ENCOUNTER — LAB VISIT (OUTPATIENT)
Dept: LAB | Facility: HOSPITAL | Age: 60
End: 2025-08-11
Attending: ANESTHESIOLOGY
Payer: COMMERCIAL

## 2025-08-11 ENCOUNTER — OFFICE VISIT (OUTPATIENT)
Dept: UROLOGY | Facility: CLINIC | Age: 60
End: 2025-08-11
Payer: COMMERCIAL

## 2025-08-11 DIAGNOSIS — Z01.818 PREOP TESTING: ICD-10-CM

## 2025-08-11 DIAGNOSIS — R30.0 DYSURIA: Primary | ICD-10-CM

## 2025-08-11 DIAGNOSIS — I10 HYPERTENSION, UNSPECIFIED TYPE: ICD-10-CM

## 2025-08-11 LAB
ALBUMIN SERPL BCP-MCNC: 4 G/DL (ref 3.5–5.2)
ALP SERPL-CCNC: 80 UNIT/L (ref 40–150)
ALT SERPL W/O P-5'-P-CCNC: 48 UNIT/L (ref 0–55)
ANION GAP (OHS): 9 MMOL/L (ref 8–16)
AST SERPL-CCNC: 31 UNIT/L (ref 0–50)
BILIRUB SERPL-MCNC: 0.8 MG/DL (ref 0.1–1)
BUN SERPL-MCNC: 22 MG/DL (ref 8–23)
CALCIUM SERPL-MCNC: 9.7 MG/DL (ref 8.7–10.5)
CHLORIDE SERPL-SCNC: 102 MMOL/L (ref 95–110)
CO2 SERPL-SCNC: 28 MMOL/L (ref 23–29)
CREAT SERPL-MCNC: 1.4 MG/DL (ref 0.5–1.4)
GFR SERPLBLD CREATININE-BSD FMLA CKD-EPI: 57 ML/MIN/1.73/M2
GLUCOSE SERPL-MCNC: 90 MG/DL (ref 70–110)
INDIRECT COOMBS: NORMAL
OHS QRS DURATION: 92 MS
OHS QTC CALCULATION: 430 MS
POTASSIUM SERPL-SCNC: 4.4 MMOL/L (ref 3.5–5.1)
PROT SERPL-MCNC: 7.1 GM/DL (ref 6–8.4)
RH BLD: NORMAL
SODIUM SERPL-SCNC: 139 MMOL/L (ref 136–145)
SPECIMEN OUTDATE: NORMAL

## 2025-08-11 PROCEDURE — 99499 UNLISTED E&M SERVICE: CPT | Mod: S$GLB,,, | Performed by: UROLOGY

## 2025-08-11 PROCEDURE — 84132 ASSAY OF SERUM POTASSIUM: CPT

## 2025-08-11 PROCEDURE — 87186 SC STD MICRODIL/AGAR DIL: CPT

## 2025-08-11 PROCEDURE — 99999 PR PBB SHADOW E&M-EST. PATIENT-LVL I: CPT | Mod: PBBFAC,,,

## 2025-08-11 PROCEDURE — 36415 COLL VENOUS BLD VENIPUNCTURE: CPT

## 2025-08-11 PROCEDURE — 93010 ELECTROCARDIOGRAM REPORT: CPT | Mod: S$GLB,,, | Performed by: INTERNAL MEDICINE

## 2025-08-11 PROCEDURE — 86850 RBC ANTIBODY SCREEN: CPT | Performed by: ANESTHESIOLOGY

## 2025-08-14 ENCOUNTER — RESULTS FOLLOW-UP (OUTPATIENT)
Dept: UROLOGY | Facility: HOSPITAL | Age: 60
End: 2025-08-14
Payer: COMMERCIAL

## 2025-08-14 ENCOUNTER — HOSPITAL ENCOUNTER (EMERGENCY)
Facility: HOSPITAL | Age: 60
Discharge: HOME | End: 2025-08-14
Attending: EMERGENCY MEDICINE
Payer: COMMERCIAL

## 2025-08-14 VITALS
SYSTOLIC BLOOD PRESSURE: 118 MMHG | HEART RATE: 69 BPM | BODY MASS INDEX: 31.08 KG/M2 | DIASTOLIC BLOOD PRESSURE: 70 MMHG | OXYGEN SATURATION: 98 % | TEMPERATURE: 98 F | HEIGHT: 67 IN | WEIGHT: 198 LBS | RESPIRATION RATE: 18 BRPM

## 2025-08-14 DIAGNOSIS — M25.579 ANKLE PAIN: ICD-10-CM

## 2025-08-14 LAB — BACTERIA UR CULT: ABNORMAL

## 2025-08-14 PROCEDURE — 96372 THER/PROPH/DIAG INJ SC/IM: CPT | Performed by: EMERGENCY MEDICINE

## 2025-08-14 PROCEDURE — 63600175 PHARM REV CODE 636 W HCPCS: Mod: ER | Performed by: EMERGENCY MEDICINE

## 2025-08-14 PROCEDURE — 99284 EMERGENCY DEPT VISIT MOD MDM: CPT | Mod: 25,ER

## 2025-08-14 RX ORDER — DEXAMETHASONE SODIUM PHOSPHATE 4 MG/ML
8 INJECTION, SOLUTION INTRA-ARTICULAR; INTRALESIONAL; INTRAMUSCULAR; INTRAVENOUS; SOFT TISSUE
Status: COMPLETED | OUTPATIENT
Start: 2025-08-14 | End: 2025-08-14

## 2025-08-14 RX ORDER — PROCHLORPERAZINE EDISYLATE 5 MG/ML
10 INJECTION INTRAMUSCULAR; INTRAVENOUS
Status: COMPLETED | OUTPATIENT
Start: 2025-08-14 | End: 2025-08-14

## 2025-08-14 RX ORDER — SULFAMETHOXAZOLE AND TRIMETHOPRIM 800; 160 MG/1; MG/1
1 TABLET ORAL 2 TIMES DAILY
Qty: 28 TABLET | Refills: 0 | Status: SHIPPED | OUTPATIENT
Start: 2025-08-14 | End: 2025-08-28

## 2025-08-14 RX ADMIN — DEXAMETHASONE SODIUM PHOSPHATE 8 MG: 4 INJECTION, SOLUTION INTRA-ARTICULAR; INTRALESIONAL; INTRAMUSCULAR; INTRAVENOUS; SOFT TISSUE at 06:08

## 2025-08-14 RX ADMIN — PROCHLORPERAZINE EDISYLATE 10 MG: 5 INJECTION INTRAMUSCULAR; INTRAVENOUS at 06:08

## 2025-08-15 ENCOUNTER — OFFICE VISIT (OUTPATIENT)
Facility: CLINIC | Age: 60
End: 2025-08-15
Payer: COMMERCIAL

## 2025-08-15 VITALS
SYSTOLIC BLOOD PRESSURE: 140 MMHG | OXYGEN SATURATION: 95 % | DIASTOLIC BLOOD PRESSURE: 79 MMHG | BODY MASS INDEX: 30.76 KG/M2 | TEMPERATURE: 98 F | HEART RATE: 103 BPM | WEIGHT: 196 LBS | HEIGHT: 67 IN

## 2025-08-15 DIAGNOSIS — E66.9 OBESITY, UNSPECIFIED CLASS, UNSPECIFIED OBESITY TYPE, UNSPECIFIED WHETHER SERIOUS COMORBIDITY PRESENT: ICD-10-CM

## 2025-08-15 DIAGNOSIS — N28.89 RENAL MASS: ICD-10-CM

## 2025-08-15 DIAGNOSIS — E78.5 DYSLIPIDEMIA: ICD-10-CM

## 2025-08-15 DIAGNOSIS — N28.9 RENAL INSUFFICIENCY: ICD-10-CM

## 2025-08-15 DIAGNOSIS — I10 HYPERTENSION, UNSPECIFIED TYPE: Primary | ICD-10-CM

## 2025-08-15 DIAGNOSIS — M10.9 GOUT, UNSPECIFIED CAUSE, UNSPECIFIED CHRONICITY, UNSPECIFIED SITE: ICD-10-CM

## 2025-08-15 DIAGNOSIS — N48.6 PEYRONIE'S DISEASE: ICD-10-CM

## 2025-08-15 DIAGNOSIS — Z87.898 HISTORY OF CHEST PAIN: ICD-10-CM

## 2025-08-15 DIAGNOSIS — D64.9 ANEMIA, UNSPECIFIED TYPE: ICD-10-CM

## 2025-08-15 PROCEDURE — 99999 PR PBB SHADOW E&M-EST. PATIENT-LVL V: CPT | Mod: PBBFAC,,, | Performed by: NURSE PRACTITIONER

## 2025-08-21 ENCOUNTER — TELEPHONE (OUTPATIENT)
Dept: UROLOGY | Facility: CLINIC | Age: 60
End: 2025-08-21
Payer: COMMERCIAL

## 2025-08-21 ENCOUNTER — ANESTHESIA EVENT (OUTPATIENT)
Dept: SURGERY | Facility: HOSPITAL | Age: 60
DRG: 658 | End: 2025-08-21
Payer: COMMERCIAL

## 2025-08-22 ENCOUNTER — ANESTHESIA (OUTPATIENT)
Dept: SURGERY | Facility: HOSPITAL | Age: 60
DRG: 658 | End: 2025-08-22
Payer: COMMERCIAL

## 2025-08-22 ENCOUNTER — HOSPITAL ENCOUNTER (INPATIENT)
Facility: HOSPITAL | Age: 60
LOS: 1 days | Discharge: HOME | DRG: 658 | End: 2025-08-23
Attending: UROLOGY | Admitting: UROLOGY
Payer: COMMERCIAL

## 2025-08-22 DIAGNOSIS — I10 HYPERTENSION, UNSPECIFIED TYPE: ICD-10-CM

## 2025-08-22 DIAGNOSIS — N28.89 RENAL MASS: ICD-10-CM

## 2025-08-22 DIAGNOSIS — Z01.818 PREOP TESTING: Primary | ICD-10-CM

## 2025-08-22 LAB
ABSOLUTE EOSINOPHIL (OHS): 0.05 K/UL
ABSOLUTE MONOCYTE (OHS): 0.17 K/UL (ref 0.3–1)
ABSOLUTE NEUTROPHIL COUNT (OHS): 4.34 K/UL (ref 1.8–7.7)
ALBUMIN SERPL BCP-MCNC: 3.2 G/DL (ref 3.5–5.2)
ALP SERPL-CCNC: 47 UNIT/L (ref 40–150)
ALT SERPL W/O P-5'-P-CCNC: 17 UNIT/L (ref 0–55)
ANION GAP (OHS): 10 MMOL/L (ref 8–16)
AST SERPL-CCNC: 29 UNIT/L (ref 0–50)
BASOPHILS # BLD AUTO: 0.01 K/UL
BASOPHILS NFR BLD AUTO: 0.2 %
BILIRUB SERPL-MCNC: 0.7 MG/DL (ref 0.1–1)
BUN SERPL-MCNC: 26 MG/DL (ref 8–23)
CALCIUM SERPL-MCNC: 9.6 MG/DL (ref 8.7–10.5)
CHLORIDE SERPL-SCNC: 107 MMOL/L (ref 95–110)
CO2 SERPL-SCNC: 21 MMOL/L (ref 23–29)
CREAT SERPL-MCNC: 1.8 MG/DL (ref 0.5–1.4)
ERYTHROCYTE [DISTWIDTH] IN BLOOD BY AUTOMATED COUNT: 12.9 % (ref 11.5–14.5)
GFR SERPLBLD CREATININE-BSD FMLA CKD-EPI: 42 ML/MIN/1.73/M2
GLUCOSE SERPL-MCNC: 126 MG/DL (ref 70–110)
GLUCOSE SERPL-MCNC: 139 MG/DL (ref 70–110)
HCO3 UR-SCNC: 16.7 MMOL/L (ref 24–28)
HCT VFR BLD AUTO: 33.5 % (ref 40–54)
HCT VFR BLD CALC: 33 %PCV (ref 36–54)
HGB BLD-MCNC: 10.9 GM/DL (ref 14–18)
IMM GRANULOCYTES # BLD AUTO: 0.02 K/UL (ref 0–0.04)
IMM GRANULOCYTES NFR BLD AUTO: 0.4 % (ref 0–0.5)
INDIRECT COOMBS: NORMAL
LYMPHOCYTES # BLD AUTO: 0.34 K/UL (ref 1–4.8)
MCH RBC QN AUTO: 30.4 PG (ref 27–31)
MCHC RBC AUTO-ENTMCNC: 32.5 G/DL (ref 32–36)
MCV RBC AUTO: 93 FL (ref 82–98)
NUCLEATED RBC (/100WBC) (OHS): 0 /100 WBC
PCO2 BLDA: 30 MMHG (ref 35–45)
PH SMN: 7.35 [PH] (ref 7.35–7.45)
PLATELET # BLD AUTO: 184 K/UL (ref 150–450)
PMV BLD AUTO: 9 FL (ref 9.2–12.9)
PO2 BLDA: 134 MMHG (ref 40–60)
POC BE: -9 MMOL/L (ref -2–2)
POC IONIZED CALCIUM: 1.59 MMOL/L (ref 1.06–1.42)
POC SATURATED O2: 99 % (ref 95–100)
POC TCO2: 18 MMOL/L (ref 24–29)
POTASSIUM BLD-SCNC: 3.8 MMOL/L (ref 3.5–5.1)
POTASSIUM SERPL-SCNC: 4.5 MMOL/L (ref 3.5–5.1)
PROT SERPL-MCNC: 6.1 GM/DL (ref 6–8.4)
RBC # BLD AUTO: 3.59 M/UL (ref 4.6–6.2)
RELATIVE EOSINOPHIL (OHS): 1 %
RELATIVE LYMPHOCYTE (OHS): 6.9 % (ref 18–48)
RELATIVE MONOCYTE (OHS): 3.4 % (ref 4–15)
RELATIVE NEUTROPHIL (OHS): 88.1 % (ref 38–73)
RH BLD: NORMAL
SAMPLE: ABNORMAL
SODIUM BLD-SCNC: 137 MMOL/L (ref 136–145)
SODIUM SERPL-SCNC: 138 MMOL/L (ref 136–145)
SPECIMEN OUTDATE: NORMAL
WBC # BLD AUTO: 4.93 K/UL (ref 3.9–12.7)

## 2025-08-22 PROCEDURE — 63600175 PHARM REV CODE 636 W HCPCS

## 2025-08-22 PROCEDURE — 50543 LAPARO PARTIAL NEPHRECTOMY: CPT | Mod: RT,,, | Performed by: UROLOGY

## 2025-08-22 PROCEDURE — 25000003 PHARM REV CODE 250

## 2025-08-22 PROCEDURE — 27000221 HC OXYGEN, UP TO 24 HOURS

## 2025-08-22 PROCEDURE — 8E0W4CZ ROBOTIC ASSISTED PROCEDURE OF TRUNK REGION, PERCUTANEOUS ENDOSCOPIC APPROACH: ICD-10-PCS | Performed by: UROLOGY

## 2025-08-22 PROCEDURE — 11000001 HC ACUTE MED/SURG PRIVATE ROOM

## 2025-08-22 PROCEDURE — C1729 CATH, DRAINAGE: HCPCS | Performed by: UROLOGY

## 2025-08-22 PROCEDURE — 37000009 HC ANESTHESIA EA ADD 15 MINS: Performed by: UROLOGY

## 2025-08-22 PROCEDURE — 88342 IMHCHEM/IMCYTCHM 1ST ANTB: CPT | Mod: TC | Performed by: UROLOGY

## 2025-08-22 PROCEDURE — 71000039 HC RECOVERY, EACH ADD'L HOUR: Performed by: UROLOGY

## 2025-08-22 PROCEDURE — 36000712 HC OR TIME LEV V 1ST 15 MIN: Performed by: UROLOGY

## 2025-08-22 PROCEDURE — 82040 ASSAY OF SERUM ALBUMIN: CPT

## 2025-08-22 PROCEDURE — 94761 N-INVAS EAR/PLS OXIMETRY MLT: CPT

## 2025-08-22 PROCEDURE — 37000008 HC ANESTHESIA 1ST 15 MINUTES: Performed by: UROLOGY

## 2025-08-22 PROCEDURE — 71000016 HC POSTOP RECOV ADDL HR: Performed by: UROLOGY

## 2025-08-22 PROCEDURE — 71000033 HC RECOVERY, INTIAL HOUR: Performed by: UROLOGY

## 2025-08-22 PROCEDURE — 71000015 HC POSTOP RECOV 1ST HR: Performed by: UROLOGY

## 2025-08-22 PROCEDURE — P9045 ALBUMIN (HUMAN), 5%, 250 ML: HCPCS | Mod: JZ,TB

## 2025-08-22 PROCEDURE — 85025 COMPLETE CBC W/AUTO DIFF WBC: CPT

## 2025-08-22 PROCEDURE — 36000713 HC OR TIME LEV V EA ADD 15 MIN: Performed by: UROLOGY

## 2025-08-22 PROCEDURE — 86901 BLOOD TYPING SEROLOGIC RH(D): CPT

## 2025-08-22 PROCEDURE — 27201423 OPTIME MED/SURG SUP & DEVICES STERILE SUPPLY: Performed by: UROLOGY

## 2025-08-22 PROCEDURE — 99900035 HC TECH TIME PER 15 MIN (STAT)

## 2025-08-22 PROCEDURE — 0TB04ZZ EXCISION OF RIGHT KIDNEY, PERCUTANEOUS ENDOSCOPIC APPROACH: ICD-10-PCS | Performed by: UROLOGY

## 2025-08-22 RX ORDER — LOSARTAN POTASSIUM 50 MG/1
100 TABLET ORAL DAILY
Status: DISCONTINUED | OUTPATIENT
Start: 2025-08-22 | End: 2025-08-23 | Stop reason: HOSPADM

## 2025-08-22 RX ORDER — ACETAMINOPHEN 500 MG
1000 TABLET ORAL EVERY 6 HOURS
Status: DISCONTINUED | OUTPATIENT
Start: 2025-08-22 | End: 2025-08-23 | Stop reason: HOSPADM

## 2025-08-22 RX ORDER — CALCIUM CHLORIDE DIHYDRATE 100 MG/ML
INJECTION, SOLUTION INTRAVENOUS
Status: DISCONTINUED | OUTPATIENT
Start: 2025-08-22 | End: 2025-08-22

## 2025-08-22 RX ORDER — CYCLOBENZAPRINE HCL 10 MG
10 TABLET ORAL 3 TIMES DAILY PRN
Status: DISCONTINUED | OUTPATIENT
Start: 2025-08-22 | End: 2025-08-22

## 2025-08-22 RX ORDER — PRAVASTATIN SODIUM 40 MG/1
80 TABLET ORAL DAILY
Status: DISCONTINUED | OUTPATIENT
Start: 2025-08-22 | End: 2025-08-23 | Stop reason: HOSPADM

## 2025-08-22 RX ORDER — ONDANSETRON HYDROCHLORIDE 2 MG/ML
INJECTION, SOLUTION INTRAVENOUS
Status: DISCONTINUED | OUTPATIENT
Start: 2025-08-22 | End: 2025-08-22

## 2025-08-22 RX ORDER — OXYCODONE HYDROCHLORIDE 5 MG/1
5 TABLET ORAL EVERY 4 HOURS PRN
Refills: 0 | Status: DISCONTINUED | OUTPATIENT
Start: 2025-08-22 | End: 2025-08-23 | Stop reason: HOSPADM

## 2025-08-22 RX ORDER — ACETAMINOPHEN 500 MG
1000 TABLET ORAL
Status: COMPLETED | OUTPATIENT
Start: 2025-08-22 | End: 2025-08-22

## 2025-08-22 RX ORDER — OXYCODONE HYDROCHLORIDE 5 MG/1
5 TABLET ORAL
Status: DISCONTINUED | OUTPATIENT
Start: 2025-08-22 | End: 2025-08-22 | Stop reason: HOSPADM

## 2025-08-22 RX ORDER — INDOMETHACIN 25 MG/1
CAPSULE ORAL
Status: DISCONTINUED | OUTPATIENT
Start: 2025-08-22 | End: 2025-08-22

## 2025-08-22 RX ORDER — PREGABALIN 75 MG/1
150 CAPSULE ORAL
Status: COMPLETED | OUTPATIENT
Start: 2025-08-22 | End: 2025-08-22

## 2025-08-22 RX ORDER — FENTANYL CITRATE 50 UG/ML
INJECTION, SOLUTION INTRAMUSCULAR; INTRAVENOUS
Status: DISCONTINUED | OUTPATIENT
Start: 2025-08-22 | End: 2025-08-22

## 2025-08-22 RX ORDER — CEFAZOLIN SODIUM 1 G/3ML
INJECTION, POWDER, FOR SOLUTION INTRAMUSCULAR; INTRAVENOUS
Status: DISCONTINUED | OUTPATIENT
Start: 2025-08-22 | End: 2025-08-22

## 2025-08-22 RX ORDER — PROPOFOL 10 MG/ML
VIAL (ML) INTRAVENOUS
Status: DISCONTINUED | OUTPATIENT
Start: 2025-08-22 | End: 2025-08-22

## 2025-08-22 RX ORDER — SODIUM CHLORIDE, SODIUM LACTATE, POTASSIUM CHLORIDE, CALCIUM CHLORIDE 600; 310; 30; 20 MG/100ML; MG/100ML; MG/100ML; MG/100ML
INJECTION, SOLUTION INTRAVENOUS CONTINUOUS
Status: DISCONTINUED | OUTPATIENT
Start: 2025-08-22 | End: 2025-08-23 | Stop reason: HOSPADM

## 2025-08-22 RX ORDER — CEFAZOLIN 2 G/1
2 INJECTION, POWDER, FOR SOLUTION INTRAMUSCULAR; INTRAVENOUS
Status: DISCONTINUED | OUTPATIENT
Start: 2025-08-22 | End: 2025-08-22 | Stop reason: HOSPADM

## 2025-08-22 RX ORDER — MIDAZOLAM HYDROCHLORIDE 1 MG/ML
.5-4 INJECTION, SOLUTION INTRAMUSCULAR; INTRAVENOUS
Status: DISCONTINUED | OUTPATIENT
Start: 2025-08-22 | End: 2025-08-22 | Stop reason: HOSPADM

## 2025-08-22 RX ORDER — TAMSULOSIN HYDROCHLORIDE 0.4 MG/1
0.4 CAPSULE ORAL
Status: DISCONTINUED | OUTPATIENT
Start: 2025-08-22 | End: 2025-08-23 | Stop reason: HOSPADM

## 2025-08-22 RX ORDER — CEFAZOLIN 2 G/1
2 INJECTION, POWDER, FOR SOLUTION INTRAMUSCULAR; INTRAVENOUS
Status: DISPENSED | OUTPATIENT
Start: 2025-08-22 | End: 2025-08-23

## 2025-08-22 RX ORDER — PHENYLEPHRINE HCL IN 0.9% NACL 1 MG/10 ML
SYRINGE (ML) INTRAVENOUS
Status: DISCONTINUED | OUTPATIENT
Start: 2025-08-22 | End: 2025-08-22

## 2025-08-22 RX ORDER — KETAMINE HCL IN 0.9 % NACL 50 MG/5 ML
SYRINGE (ML) INTRAVENOUS
Status: DISCONTINUED | OUTPATIENT
Start: 2025-08-22 | End: 2025-08-22

## 2025-08-22 RX ORDER — ROCURONIUM BROMIDE 10 MG/ML
INJECTION, SOLUTION INTRAVENOUS
Status: DISCONTINUED | OUTPATIENT
Start: 2025-08-22 | End: 2025-08-22

## 2025-08-22 RX ORDER — PROMETHAZINE HYDROCHLORIDE 25 MG/1
25 TABLET ORAL EVERY 6 HOURS PRN
Status: DISCONTINUED | OUTPATIENT
Start: 2025-08-22 | End: 2025-08-23 | Stop reason: HOSPADM

## 2025-08-22 RX ORDER — ALBUMIN HUMAN 50 G/1000ML
SOLUTION INTRAVENOUS
Status: DISCONTINUED | OUTPATIENT
Start: 2025-08-22 | End: 2025-08-22

## 2025-08-22 RX ORDER — DEXAMETHASONE SODIUM PHOSPHATE 4 MG/ML
INJECTION, SOLUTION INTRA-ARTICULAR; INTRALESIONAL; INTRAMUSCULAR; INTRAVENOUS; SOFT TISSUE
Status: DISCONTINUED | OUTPATIENT
Start: 2025-08-22 | End: 2025-08-22

## 2025-08-22 RX ORDER — ONDANSETRON 8 MG/1
8 TABLET, ORALLY DISINTEGRATING ORAL EVERY 8 HOURS PRN
Status: DISCONTINUED | OUTPATIENT
Start: 2025-08-22 | End: 2025-08-23 | Stop reason: HOSPADM

## 2025-08-22 RX ORDER — METHOCARBAMOL 500 MG/1
500 TABLET, FILM COATED ORAL 3 TIMES DAILY
Status: DISCONTINUED | OUTPATIENT
Start: 2025-08-22 | End: 2025-08-22

## 2025-08-22 RX ORDER — GLUCAGON 1 MG
1 KIT INJECTION
Status: DISCONTINUED | OUTPATIENT
Start: 2025-08-22 | End: 2025-08-22 | Stop reason: HOSPADM

## 2025-08-22 RX ORDER — FAMOTIDINE 20 MG/1
20 TABLET, FILM COATED ORAL 2 TIMES DAILY
Status: DISCONTINUED | OUTPATIENT
Start: 2025-08-22 | End: 2025-08-23

## 2025-08-22 RX ORDER — EPHEDRINE SULFATE 50 MG/ML
INJECTION, SOLUTION INTRAVENOUS
Status: DISCONTINUED | OUTPATIENT
Start: 2025-08-22 | End: 2025-08-22

## 2025-08-22 RX ORDER — MIDAZOLAM HCL 2 MG/ML
SYRUP ORAL
Status: DISCONTINUED | OUTPATIENT
Start: 2025-08-22 | End: 2025-08-22

## 2025-08-22 RX ORDER — HEPARIN SODIUM 5000 [USP'U]/ML
5000 INJECTION, SOLUTION INTRAVENOUS; SUBCUTANEOUS EVERY 8 HOURS
Status: DISCONTINUED | OUTPATIENT
Start: 2025-08-22 | End: 2025-08-23 | Stop reason: HOSPADM

## 2025-08-22 RX ORDER — FENTANYL CITRATE 50 UG/ML
25-200 INJECTION, SOLUTION INTRAMUSCULAR; INTRAVENOUS
Status: DISCONTINUED | OUTPATIENT
Start: 2025-08-22 | End: 2025-08-22 | Stop reason: HOSPADM

## 2025-08-22 RX ORDER — KETOROLAC TROMETHAMINE 15 MG/ML
15 INJECTION, SOLUTION INTRAMUSCULAR; INTRAVENOUS
Status: DISCONTINUED | OUTPATIENT
Start: 2025-08-22 | End: 2025-08-22 | Stop reason: HOSPADM

## 2025-08-22 RX ORDER — HYDROMORPHONE HYDROCHLORIDE 1 MG/ML
0.2 INJECTION, SOLUTION INTRAMUSCULAR; INTRAVENOUS; SUBCUTANEOUS EVERY 5 MIN PRN
Status: DISCONTINUED | OUTPATIENT
Start: 2025-08-22 | End: 2025-08-22 | Stop reason: HOSPADM

## 2025-08-22 RX ORDER — MIDAZOLAM HYDROCHLORIDE 5 MG/ML
INJECTION INTRAMUSCULAR; INTRAVENOUS
Status: DISCONTINUED | OUTPATIENT
Start: 2025-08-22 | End: 2025-08-22

## 2025-08-22 RX ORDER — METHOCARBAMOL 750 MG/1
750 TABLET, FILM COATED ORAL 3 TIMES DAILY
Status: DISCONTINUED | OUTPATIENT
Start: 2025-08-22 | End: 2025-08-23 | Stop reason: HOSPADM

## 2025-08-22 RX ORDER — SODIUM CHLORIDE 0.9 % (FLUSH) 0.9 %
10 SYRINGE (ML) INJECTION EVERY 6 HOURS PRN
Status: DISCONTINUED | OUTPATIENT
Start: 2025-08-22 | End: 2025-08-22 | Stop reason: HOSPADM

## 2025-08-22 RX ORDER — LIDOCAINE HYDROCHLORIDE 10 MG/ML
INJECTION, SOLUTION INTRAVENOUS
Status: DISCONTINUED | OUTPATIENT
Start: 2025-08-22 | End: 2025-08-22

## 2025-08-22 RX ORDER — ONDANSETRON HYDROCHLORIDE 2 MG/ML
4 INJECTION, SOLUTION INTRAVENOUS DAILY PRN
Status: DISCONTINUED | OUTPATIENT
Start: 2025-08-22 | End: 2025-08-22 | Stop reason: HOSPADM

## 2025-08-22 RX ORDER — HEPARIN SODIUM 5000 [USP'U]/ML
5000 INJECTION, SOLUTION INTRAVENOUS; SUBCUTANEOUS EVERY 8 HOURS
Status: DISCONTINUED | OUTPATIENT
Start: 2025-08-22 | End: 2025-08-22

## 2025-08-22 RX ORDER — SODIUM CHLORIDE 9 MG/ML
INJECTION, SOLUTION INTRAVENOUS CONTINUOUS
Status: DISCONTINUED | OUTPATIENT
Start: 2025-08-22 | End: 2025-08-23 | Stop reason: HOSPADM

## 2025-08-22 RX ADMIN — PREGABALIN 150 MG: 75 CAPSULE ORAL at 05:08

## 2025-08-22 RX ADMIN — NOREPINEPHRINE BITARTRATE 0.1 MCG/KG/MIN: 1 INJECTION, SOLUTION, CONCENTRATE INTRAVENOUS at 08:08

## 2025-08-22 RX ADMIN — HYDROMORPHONE HYDROCHLORIDE 0.2 MG: 1 INJECTION, SOLUTION INTRAMUSCULAR; INTRAVENOUS; SUBCUTANEOUS at 12:08

## 2025-08-22 RX ADMIN — FENTANYL CITRATE 50 MCG: 50 INJECTION INTRAMUSCULAR; INTRAVENOUS at 06:08

## 2025-08-22 RX ADMIN — Medication 100 MCG: at 07:08

## 2025-08-22 RX ADMIN — SODIUM CHLORIDE, POTASSIUM CHLORIDE, SODIUM LACTATE AND CALCIUM CHLORIDE: 600; 310; 30; 20 INJECTION, SOLUTION INTRAVENOUS at 11:08

## 2025-08-22 RX ADMIN — OXYCODONE HYDROCHLORIDE 5 MG: 5 TABLET ORAL at 09:08

## 2025-08-22 RX ADMIN — Medication 200 MCG: at 07:08

## 2025-08-22 RX ADMIN — MIDAZOLAM 2 MG: 1 INJECTION INTRAMUSCULAR; INTRAVENOUS at 06:08

## 2025-08-22 RX ADMIN — METHOCARBAMOL 500 MG: 500 TABLET ORAL at 04:08

## 2025-08-22 RX ADMIN — SODIUM CHLORIDE, POTASSIUM CHLORIDE, SODIUM LACTATE AND CALCIUM CHLORIDE: 600; 310; 30; 20 INJECTION, SOLUTION INTRAVENOUS at 09:08

## 2025-08-22 RX ADMIN — HEPARIN SODIUM 5000 UNITS: 5000 INJECTION INTRAVENOUS; SUBCUTANEOUS at 09:08

## 2025-08-22 RX ADMIN — CALCIUM CHLORIDE 1 G: 100 INJECTION, SOLUTION INTRAVENOUS at 09:08

## 2025-08-22 RX ADMIN — FENTANYL CITRATE 25 MCG: 50 INJECTION, SOLUTION INTRAMUSCULAR; INTRAVENOUS at 10:08

## 2025-08-22 RX ADMIN — ALBUMIN (HUMAN) 500 ML: 12.5 SOLUTION INTRAVENOUS at 09:08

## 2025-08-22 RX ADMIN — METHOCARBAMOL 750 MG: 750 TABLET ORAL at 09:08

## 2025-08-22 RX ADMIN — HEPARIN SODIUM 5000 UNITS: 5000 INJECTION INTRAVENOUS; SUBCUTANEOUS at 06:08

## 2025-08-22 RX ADMIN — ROCURONIUM BROMIDE 20 MG: 10 INJECTION, SOLUTION INTRAVENOUS at 08:08

## 2025-08-22 RX ADMIN — SODIUM CHLORIDE: 9 INJECTION, SOLUTION INTRAVENOUS at 06:08

## 2025-08-22 RX ADMIN — ROCURONIUM BROMIDE 70 MG: 10 INJECTION, SOLUTION INTRAVENOUS at 07:08

## 2025-08-22 RX ADMIN — SUGAMMADEX 400 MG: 100 INJECTION, SOLUTION INTRAVENOUS at 10:08

## 2025-08-22 RX ADMIN — EPHEDRINE SULFATE 5 MG: 50 INJECTION INTRAVENOUS at 07:08

## 2025-08-22 RX ADMIN — LIDOCAINE HYDROCHLORIDE 80 MG: 10 INJECTION, SOLUTION INTRAVENOUS at 07:08

## 2025-08-22 RX ADMIN — ACETAMINOPHEN 1000 MG: 500 TABLET ORAL at 05:08

## 2025-08-22 RX ADMIN — PROPOFOL 60 MG: 10 INJECTION, EMULSION INTRAVENOUS at 09:08

## 2025-08-22 RX ADMIN — EPHEDRINE SULFATE 10 MG: 50 INJECTION INTRAVENOUS at 07:08

## 2025-08-22 RX ADMIN — SODIUM CHLORIDE: 9 INJECTION, SOLUTION INTRAVENOUS at 09:08

## 2025-08-22 RX ADMIN — MIDAZOLAM HYDROCHLORIDE 2 MG: 5 INJECTION, SOLUTION INTRAMUSCULAR; INTRAVENOUS at 10:08

## 2025-08-22 RX ADMIN — Medication 400 MCG: at 07:08

## 2025-08-22 RX ADMIN — Medication 10 MG: at 09:08

## 2025-08-22 RX ADMIN — Medication 30 MG: at 08:08

## 2025-08-22 RX ADMIN — ALBUMIN (HUMAN) 500 ML: 12.5 SOLUTION INTRAVENOUS at 08:08

## 2025-08-22 RX ADMIN — DEXAMETHASONE SODIUM PHOSPHATE 4 MG: 4 INJECTION, SOLUTION INTRAMUSCULAR; INTRAVENOUS at 07:08

## 2025-08-22 RX ADMIN — FENTANYL CITRATE 50 MCG: 50 INJECTION, SOLUTION INTRAMUSCULAR; INTRAVENOUS at 09:08

## 2025-08-22 RX ADMIN — FENTANYL CITRATE 25 MCG: 50 INJECTION, SOLUTION INTRAMUSCULAR; INTRAVENOUS at 09:08

## 2025-08-22 RX ADMIN — CALCIUM CHLORIDE 1 G: 100 INJECTION, SOLUTION INTRAVENOUS at 07:08

## 2025-08-22 RX ADMIN — SODIUM CHLORIDE: 9 INJECTION, SOLUTION INTRAVENOUS at 07:08

## 2025-08-22 RX ADMIN — SODIUM CHLORIDE: 9 INJECTION, SOLUTION INTRAVENOUS at 10:08

## 2025-08-22 RX ADMIN — CEFAZOLIN 2 G: 330 INJECTION, POWDER, FOR SOLUTION INTRAMUSCULAR; INTRAVENOUS at 07:08

## 2025-08-22 RX ADMIN — TAMSULOSIN HYDROCHLORIDE 0.4 MG: 0.4 CAPSULE ORAL at 06:08

## 2025-08-22 RX ADMIN — OXYCODONE HYDROCHLORIDE 5 MG: 5 TABLET ORAL at 04:08

## 2025-08-22 RX ADMIN — FAMOTIDINE 20 MG: 20 TABLET, FILM COATED ORAL at 09:08

## 2025-08-22 RX ADMIN — HEPARIN SODIUM 5000 UNITS: 5000 INJECTION INTRAVENOUS; SUBCUTANEOUS at 04:08

## 2025-08-22 RX ADMIN — OXYCODONE HYDROCHLORIDE 5 MG: 5 TABLET ORAL at 12:08

## 2025-08-22 RX ADMIN — ACETAMINOPHEN 1000 MG: 500 TABLET ORAL at 06:08

## 2025-08-22 RX ADMIN — ROCURONIUM BROMIDE 30 MG: 10 INJECTION, SOLUTION INTRAVENOUS at 08:08

## 2025-08-22 RX ADMIN — ROCURONIUM BROMIDE 30 MG: 10 INJECTION, SOLUTION INTRAVENOUS at 07:08

## 2025-08-22 RX ADMIN — PROPOFOL 140 MG: 10 INJECTION, EMULSION INTRAVENOUS at 07:08

## 2025-08-22 RX ADMIN — SODIUM BICARBONATE 100 MEQ: 84 INJECTION, SOLUTION INTRAVENOUS at 10:08

## 2025-08-22 RX ADMIN — ONDANSETRON 4 MG: 2 INJECTION INTRAMUSCULAR; INTRAVENOUS at 09:08

## 2025-08-23 VITALS
BODY MASS INDEX: 30.76 KG/M2 | SYSTOLIC BLOOD PRESSURE: 133 MMHG | RESPIRATION RATE: 18 BRPM | TEMPERATURE: 98 F | WEIGHT: 196 LBS | HEIGHT: 67 IN | DIASTOLIC BLOOD PRESSURE: 72 MMHG | OXYGEN SATURATION: 94 % | HEART RATE: 97 BPM

## 2025-08-23 LAB
ABSOLUTE EOSINOPHIL (OHS): 0.03 K/UL
ABSOLUTE MONOCYTE (OHS): 0.51 K/UL (ref 0.3–1)
ABSOLUTE NEUTROPHIL COUNT (OHS): 4.88 K/UL (ref 1.8–7.7)
ANION GAP (OHS): 8 MMOL/L (ref 8–16)
BASOPHILS # BLD AUTO: 0.01 K/UL
BASOPHILS NFR BLD AUTO: 0.2 %
BUN SERPL-MCNC: 22 MG/DL (ref 8–23)
CALCIUM SERPL-MCNC: 9.2 MG/DL (ref 8.7–10.5)
CHLORIDE SERPL-SCNC: 107 MMOL/L (ref 95–110)
CO2 SERPL-SCNC: 23 MMOL/L (ref 23–29)
CREAT FLD-MCNC: 1.5 MG/DL
CREAT SERPL-MCNC: 1.4 MG/DL (ref 0.5–1.4)
ERYTHROCYTE [DISTWIDTH] IN BLOOD BY AUTOMATED COUNT: 12.9 % (ref 11.5–14.5)
GFR SERPLBLD CREATININE-BSD FMLA CKD-EPI: 57 ML/MIN/1.73/M2
GLUCOSE SERPL-MCNC: 88 MG/DL (ref 70–110)
HCT VFR BLD AUTO: 32.2 % (ref 40–54)
HGB BLD-MCNC: 10.7 GM/DL (ref 14–18)
IMM GRANULOCYTES # BLD AUTO: 0.05 K/UL (ref 0–0.04)
IMM GRANULOCYTES NFR BLD AUTO: 0.8 % (ref 0–0.5)
LYMPHOCYTES # BLD AUTO: 1.02 K/UL (ref 1–4.8)
MCH RBC QN AUTO: 30.3 PG (ref 27–31)
MCHC RBC AUTO-ENTMCNC: 33.2 G/DL (ref 32–36)
MCV RBC AUTO: 91 FL (ref 82–98)
NUCLEATED RBC (/100WBC) (OHS): 0 /100 WBC
PLATELET # BLD AUTO: 208 K/UL (ref 150–450)
PMV BLD AUTO: 9 FL (ref 9.2–12.9)
POTASSIUM SERPL-SCNC: 3.8 MMOL/L (ref 3.5–5.1)
RBC # BLD AUTO: 3.53 M/UL (ref 4.6–6.2)
RELATIVE EOSINOPHIL (OHS): 0.5 %
RELATIVE LYMPHOCYTE (OHS): 15.7 % (ref 18–48)
RELATIVE MONOCYTE (OHS): 7.8 % (ref 4–15)
RELATIVE NEUTROPHIL (OHS): 75 % (ref 38–73)
SODIUM SERPL-SCNC: 138 MMOL/L (ref 136–145)
WBC # BLD AUTO: 6.5 K/UL (ref 3.9–12.7)

## 2025-08-23 PROCEDURE — 63600175 PHARM REV CODE 636 W HCPCS

## 2025-08-23 PROCEDURE — 25000003 PHARM REV CODE 250

## 2025-08-23 PROCEDURE — 36415 COLL VENOUS BLD VENIPUNCTURE: CPT

## 2025-08-23 PROCEDURE — 82570 ASSAY OF URINE CREATININE: CPT

## 2025-08-23 PROCEDURE — 85025 COMPLETE CBC W/AUTO DIFF WBC: CPT

## 2025-08-23 PROCEDURE — 80048 BASIC METABOLIC PNL TOTAL CA: CPT

## 2025-08-23 RX ORDER — OXYCODONE HYDROCHLORIDE 5 MG/1
5 TABLET ORAL EVERY 4 HOURS PRN
Qty: 10 TABLET | Refills: 0 | Status: SHIPPED | OUTPATIENT
Start: 2025-08-23 | End: 2025-08-23 | Stop reason: HOSPADM

## 2025-08-23 RX ORDER — POLYETHYLENE GLYCOL 3350 17 G/17G
17 POWDER, FOR SOLUTION ORAL DAILY
Status: DISCONTINUED | OUTPATIENT
Start: 2025-08-23 | End: 2025-08-23 | Stop reason: HOSPADM

## 2025-08-23 RX ORDER — POLYETHYLENE GLYCOL 3350 17 G/17G
17 POWDER, FOR SOLUTION ORAL DAILY
Qty: 238 G | Refills: 0 | Status: SHIPPED | OUTPATIENT
Start: 2025-08-23 | End: 2025-08-23 | Stop reason: HOSPADM

## 2025-08-23 RX ORDER — FAMOTIDINE 20 MG/1
20 TABLET, FILM COATED ORAL DAILY
Status: DISCONTINUED | OUTPATIENT
Start: 2025-08-24 | End: 2025-08-23 | Stop reason: HOSPADM

## 2025-08-23 RX ADMIN — METHOCARBAMOL 750 MG: 750 TABLET ORAL at 09:08

## 2025-08-23 RX ADMIN — PRAVASTATIN SODIUM 80 MG: 40 TABLET ORAL at 09:08

## 2025-08-23 RX ADMIN — HEPARIN SODIUM 5000 UNITS: 5000 INJECTION INTRAVENOUS; SUBCUTANEOUS at 06:08

## 2025-08-23 RX ADMIN — ACETAMINOPHEN 1000 MG: 500 TABLET ORAL at 12:08

## 2025-08-23 RX ADMIN — ACETAMINOPHEN 1000 MG: 500 TABLET ORAL at 06:08

## 2025-08-23 RX ADMIN — OXYCODONE HYDROCHLORIDE 5 MG: 5 TABLET ORAL at 06:08

## 2025-08-23 RX ADMIN — CEFAZOLIN 2 G: 2 INJECTION, POWDER, FOR SOLUTION INTRAMUSCULAR; INTRAVENOUS at 12:08

## 2025-08-23 RX ADMIN — POLYETHYLENE GLYCOL 3350 17 G: 17 POWDER, FOR SOLUTION ORAL at 09:08

## 2025-08-23 RX ADMIN — LOSARTAN POTASSIUM 100 MG: 50 TABLET, FILM COATED ORAL at 09:08

## 2025-08-23 RX ADMIN — FAMOTIDINE 20 MG: 20 TABLET, FILM COATED ORAL at 09:08

## 2025-08-27 LAB
DHEA SERPL-MCNC: NORMAL
ESTROGEN SERPL-MCNC: NORMAL PG/ML
INSULIN SERPL-ACNC: NORMAL U[IU]/ML
LAB AP CLINICAL INFORMATION: NORMAL
LAB AP GROSS DESCRIPTION: NORMAL
LAB AP PERFORMING LOCATION(S): NORMAL
LAB AP REPORT FOOTNOTES: NORMAL
LAB AP SYNOPTIC CHECKLIST: NORMAL

## (undated) DEVICE — GOWN SURGICAL X-LARGE

## (undated) DEVICE — CLIP HEMO-LOK MLX LARGE LF

## (undated) DEVICE — DRAPE COLUMN DAVINCI XI

## (undated) DEVICE — DRAPE INCISE IOBAN 2 23X17IN

## (undated) DEVICE — TAPE SILK 3IN

## (undated) DEVICE — BAG TISS RETRV MONARCH 10MM

## (undated) DEVICE — SUT MCRYL PLUS 4-0 PS2 27IN

## (undated) DEVICE — DRAPE ARM DAVINCI XI

## (undated) DEVICE — SYR 10CC LUER LOCK

## (undated) DEVICE — DRAIN CHANNEL ROUND 15FR

## (undated) DEVICE — IRRIGATOR ENDOSCOPY DISP.

## (undated) DEVICE — SOL ELECTROLUBE ANTI-STIC

## (undated) DEVICE — SEAL CANN UNIVERSAL 5-12MM

## (undated) DEVICE — SUT V-LOC 90 GS22 2-0 VIO 23CM

## (undated) DEVICE — SUT ETHILON 2-0 PSLX 30IN

## (undated) DEVICE — BLADE SURG CARBON STEEL SZ11

## (undated) DEVICE — SUT EASE CROSSBOW CLSR SYS

## (undated) DEVICE — NDL INSUF ULTRA VERESS 120MM

## (undated) DEVICE — TRAY CATH 1-LYR URIMTR 16FR

## (undated) DEVICE — SYR 30CC LUER LOCK

## (undated) DEVICE — ELECTRODE REM PLYHSV RETURN 9

## (undated) DEVICE — EVACUATOR WOUND BULB 100CC

## (undated) DEVICE — ADHESIVE DERMABOND ADVANCED

## (undated) DEVICE — SET TRI-LUMEN FILTERED TUBE

## (undated) DEVICE — ELECTRODE MEGADYNE RETURN DUAL

## (undated) DEVICE — COVER TIP CURVED SCISSORS XI

## (undated) DEVICE — SUT PROLENE 4-0 RB-1 BL MO

## (undated) DEVICE — TOWEL OR DISP STRL BLUE 4/PK

## (undated) DEVICE — SUT 1 36IN PDS II VIO MONO

## (undated) DEVICE — KIT SURGIFLO HEMOSTATIC MATRIX

## (undated) DEVICE — TRAY MINOR GEN SURG OMC

## (undated) DEVICE — SUT ABS CLIP LAPRA-TY CTD

## (undated) DEVICE — Device

## (undated) DEVICE — DRAPE SCOPE PILLOW WARMER

## (undated) DEVICE — DRAPE ABDOMINAL TIBURON 14X11

## (undated) DEVICE — TROCAR ENDOPATH XCEL 5X100MM

## (undated) DEVICE — PENCIL ROCKER SWITCH 10FT CORD

## (undated) DEVICE — SYR SLIP TIP 20CC